# Patient Record
Sex: MALE | Race: WHITE | NOT HISPANIC OR LATINO | URBAN - METROPOLITAN AREA
[De-identification: names, ages, dates, MRNs, and addresses within clinical notes are randomized per-mention and may not be internally consistent; named-entity substitution may affect disease eponyms.]

---

## 2017-03-21 ENCOUNTER — HISTORICAL (OUTPATIENT)
Dept: ADMINISTRATIVE | Facility: HOSPITAL | Age: 77
End: 2017-03-21

## 2018-01-18 ENCOUNTER — HISTORICAL (OUTPATIENT)
Dept: LAB | Facility: HOSPITAL | Age: 78
End: 2018-01-18

## 2018-01-20 LAB — FINAL CULTURE: NO GROWTH

## 2019-10-08 ENCOUNTER — HISTORICAL (OUTPATIENT)
Dept: LAB | Facility: HOSPITAL | Age: 79
End: 2019-10-08

## 2019-10-08 LAB — C DIFF INTERP: NEGATIVE

## 2019-10-11 LAB — FINAL CULTURE: NORMAL

## 2022-04-10 ENCOUNTER — HISTORICAL (OUTPATIENT)
Dept: ADMINISTRATIVE | Facility: HOSPITAL | Age: 82
End: 2022-04-10

## 2022-04-27 VITALS
DIASTOLIC BLOOD PRESSURE: 76 MMHG | WEIGHT: 170 LBS | HEIGHT: 70 IN | SYSTOLIC BLOOD PRESSURE: 152 MMHG | BODY MASS INDEX: 24.34 KG/M2 | OXYGEN SATURATION: 92 %

## 2022-06-30 ENCOUNTER — LAB REQUISITION (OUTPATIENT)
Dept: LAB | Facility: HOSPITAL | Age: 82
End: 2022-06-30
Payer: COMMERCIAL

## 2022-06-30 DIAGNOSIS — N39.0 URINARY TRACT INFECTION, SITE NOT SPECIFIED: ICD-10-CM

## 2022-06-30 DIAGNOSIS — R82.991 HYPOCITRATURIA: ICD-10-CM

## 2022-06-30 PROCEDURE — 87077 CULTURE AEROBIC IDENTIFY: CPT | Performed by: SPECIALIST

## 2022-07-02 LAB — BACTERIA UR CULT: ABNORMAL

## 2022-12-19 ENCOUNTER — LAB REQUISITION (OUTPATIENT)
Dept: LAB | Facility: HOSPITAL | Age: 82
End: 2022-12-19
Payer: COMMERCIAL

## 2022-12-19 DIAGNOSIS — R30.9 PAINFUL MICTURITION, UNSPECIFIED: ICD-10-CM

## 2022-12-19 DIAGNOSIS — N31.9 NEUROMUSCULAR DYSFUNCTION OF BLADDER, UNSPECIFIED: ICD-10-CM

## 2022-12-19 LAB
APPEARANCE UR: ABNORMAL
BACTERIA #/AREA URNS AUTO: ABNORMAL /HPF
BILIRUB UR QL STRIP.AUTO: NEGATIVE MG/DL
COLOR UR AUTO: ABNORMAL
GLUCOSE UR QL STRIP.AUTO: NEGATIVE MG/DL
KETONES UR QL STRIP.AUTO: NEGATIVE MG/DL
LEUKOCYTE ESTERASE UR QL STRIP.AUTO: ABNORMAL UNIT/L
NITRITE UR QL STRIP.AUTO: NEGATIVE
PH UR STRIP.AUTO: 6 [PH]
PROT UR QL STRIP.AUTO: ABNORMAL MG/DL
RBC #/AREA URNS AUTO: <5 /HPF
RBC UR QL AUTO: ABNORMAL UNIT/L
SP GR UR STRIP.AUTO: 1.02 (ref 1–1.03)
SQUAMOUS #/AREA URNS AUTO: <5 /HPF
UROBILINOGEN UR STRIP-ACNC: 0.2 MG/DL
WBC #/AREA URNS AUTO: 493 /HPF

## 2022-12-19 PROCEDURE — 87205 SMEAR GRAM STAIN: CPT | Performed by: SPECIALIST

## 2022-12-19 PROCEDURE — 81001 URINALYSIS AUTO W/SCOPE: CPT | Performed by: SPECIALIST

## 2022-12-19 PROCEDURE — 87088 URINE BACTERIA CULTURE: CPT | Performed by: SPECIALIST

## 2022-12-20 LAB
GRAM STN SPEC: NORMAL

## 2022-12-21 LAB — BACTERIA UR CULT: NO GROWTH

## 2023-03-18 PROBLEM — J44.9 CHRONIC OBSTRUCTIVE PULMONARY DISEASE: Status: ACTIVE | Noted: 2023-03-18

## 2023-03-18 PROBLEM — R73.09 ELEVATED HEMOGLOBIN A1C: Status: ACTIVE | Noted: 2023-03-18

## 2023-03-18 PROBLEM — F41.9 ANXIETY AND DEPRESSION: Status: ACTIVE | Noted: 2023-03-18

## 2023-03-18 PROBLEM — Z85.46 HISTORY OF PROSTATE CANCER: Status: ACTIVE | Noted: 2023-03-18

## 2023-03-18 PROBLEM — F41.0 PANIC DISORDER: Status: ACTIVE | Noted: 2023-03-18

## 2023-03-18 PROBLEM — F32.A ANXIETY AND DEPRESSION: Status: ACTIVE | Noted: 2023-03-18

## 2023-03-18 PROBLEM — I48.91 ATRIAL FIBRILLATION: Status: ACTIVE | Noted: 2023-03-18

## 2023-04-13 ENCOUNTER — LAB REQUISITION (OUTPATIENT)
Dept: LAB | Facility: HOSPITAL | Age: 83
End: 2023-04-13
Payer: MEDICARE

## 2023-04-13 DIAGNOSIS — R33.9 RETENTION OF URINE, UNSPECIFIED: ICD-10-CM

## 2023-04-13 DIAGNOSIS — N39.0 URINARY TRACT INFECTION, SITE NOT SPECIFIED: ICD-10-CM

## 2023-04-13 PROCEDURE — 87205 SMEAR GRAM STAIN: CPT | Performed by: SPECIALIST

## 2023-04-13 PROCEDURE — 87077 CULTURE AEROBIC IDENTIFY: CPT | Performed by: SPECIALIST

## 2023-04-14 LAB
GRAM STN SPEC: NORMAL
GRAM STN SPEC: NORMAL

## 2023-04-15 LAB — BACTERIA UR CULT: ABNORMAL

## 2023-06-08 DIAGNOSIS — E78.2 MIXED HYPERLIPIDEMIA: ICD-10-CM

## 2023-06-08 DIAGNOSIS — R53.1 WEAKNESS: Primary | ICD-10-CM

## 2023-06-08 DIAGNOSIS — R73.9 BLOOD GLUCOSE ELEVATED: ICD-10-CM

## 2023-07-21 RX ORDER — PREDNISONE 10 MG/1
TABLET ORAL
Qty: 47 TABLET | Refills: 0 | Status: SHIPPED | OUTPATIENT
Start: 2023-07-21 | End: 2023-09-08

## 2023-07-24 ENCOUNTER — LAB REQUISITION (OUTPATIENT)
Dept: LAB | Facility: HOSPITAL | Age: 83
End: 2023-07-24
Payer: MEDICARE

## 2023-07-24 DIAGNOSIS — N39.0 URINARY TRACT INFECTION, SITE NOT SPECIFIED: ICD-10-CM

## 2023-07-24 DIAGNOSIS — R33.9 RETENTION OF URINE, UNSPECIFIED: ICD-10-CM

## 2023-07-24 DIAGNOSIS — R30.9 PAINFUL MICTURITION, UNSPECIFIED: ICD-10-CM

## 2023-07-24 LAB
GRAM STN SPEC: NORMAL

## 2023-07-24 PROCEDURE — 87088 URINE BACTERIA CULTURE: CPT | Performed by: SPECIALIST

## 2023-07-24 PROCEDURE — 87186 SC STD MICRODIL/AGAR DIL: CPT | Performed by: SPECIALIST

## 2023-07-24 PROCEDURE — 87205 SMEAR GRAM STAIN: CPT | Performed by: SPECIALIST

## 2023-07-26 LAB — BACTERIA UR CULT: ABNORMAL

## 2023-09-08 RX ORDER — PREDNISONE 10 MG/1
TABLET ORAL
Qty: 47 TABLET | Refills: 0 | Status: ON HOLD | OUTPATIENT
Start: 2023-09-08 | End: 2023-10-17 | Stop reason: HOSPADM

## 2023-09-29 ENCOUNTER — LAB REQUISITION (OUTPATIENT)
Dept: LAB | Facility: HOSPITAL | Age: 83
End: 2023-09-29
Payer: MEDICARE

## 2023-09-29 DIAGNOSIS — R82.991 HYPOCITRATURIA: ICD-10-CM

## 2023-09-29 DIAGNOSIS — R33.9 RETENTION OF URINE, UNSPECIFIED: ICD-10-CM

## 2023-09-29 PROCEDURE — 87186 SC STD MICRODIL/AGAR DIL: CPT | Performed by: SPECIALIST

## 2023-09-29 PROCEDURE — 87088 URINE BACTERIA CULTURE: CPT | Performed by: SPECIALIST

## 2023-10-01 LAB — BACTERIA UR CULT: ABNORMAL

## 2023-10-12 ENCOUNTER — HOSPITAL ENCOUNTER (INPATIENT)
Facility: HOSPITAL | Age: 83
LOS: 5 days | Discharge: HOME | DRG: 871 | End: 2023-10-17
Attending: EMERGENCY MEDICINE | Admitting: HOSPITALIST
Payer: MEDICARE

## 2023-10-12 DIAGNOSIS — A41.9 SEPSIS, DUE TO UNSPECIFIED ORGANISM, UNSPECIFIED WHETHER ACUTE ORGAN DYSFUNCTION PRESENT: ICD-10-CM

## 2023-10-12 DIAGNOSIS — J18.9 COMMUNITY ACQUIRED PNEUMONIA, UNSPECIFIED LATERALITY: Primary | ICD-10-CM

## 2023-10-12 DIAGNOSIS — R05.9 COUGH: ICD-10-CM

## 2023-10-12 DIAGNOSIS — J44.1 COPD WITH ACUTE EXACERBATION: ICD-10-CM

## 2023-10-12 LAB
ABS NEUT (OLG): 26.66 X10(3)/MCL (ref 2.1–9.2)
ALBUMIN SERPL-MCNC: 3.1 G/DL (ref 3.4–4.8)
ALBUMIN/GLOB SERPL: 0.7 RATIO (ref 1.1–2)
ALLENS TEST BLOOD GAS (OHS): ABNORMAL
ALP SERPL-CCNC: 56 UNIT/L (ref 40–150)
ALT SERPL-CCNC: 10 UNIT/L (ref 0–55)
AST SERPL-CCNC: 14 UNIT/L (ref 5–34)
BASE EXCESS BLD CALC-SCNC: -0.2 MMOL/L (ref -2–2)
BILIRUB SERPL-MCNC: 1.2 MG/DL
BLOOD GAS SAMPLE TYPE (OHS): ABNORMAL
BNP BLD-MCNC: 286 PG/ML
BUN SERPL-MCNC: 16 MG/DL (ref 8.4–25.7)
BURR CELLS (OLG): ABNORMAL
CA-I BLD-SCNC: 1.13 MMOL/L (ref 1.12–1.23)
CALCIUM SERPL-MCNC: 9.2 MG/DL (ref 8.8–10)
CHLORIDE SERPL-SCNC: 103 MMOL/L (ref 98–107)
CO2 BLDA-SCNC: 25 MMOL/L
CO2 SERPL-SCNC: 23 MMOL/L (ref 23–31)
COHGB MFR BLDA: 2.4 % (ref 0.5–1.5)
CREAT SERPL-MCNC: 0.62 MG/DL (ref 0.73–1.18)
DRAWN BY BLOOD GAS (OHS): ABNORMAL
ERYTHROCYTE [DISTWIDTH] IN BLOOD BY AUTOMATED COUNT: 13.8 % (ref 11.5–17)
FLUAV AG UPPER RESP QL IA.RAPID: NOT DETECTED
FLUBV AG UPPER RESP QL IA.RAPID: NOT DETECTED
GFR SERPLBLD CREATININE-BSD FMLA CKD-EPI: >60 MLS/MIN/1.73/M2
GLOBULIN SER-MCNC: 4.2 GM/DL (ref 2.4–3.5)
GLUCOSE SERPL-MCNC: 114 MG/DL (ref 82–115)
HCO3 BLDA-SCNC: 23.9 MMOL/L (ref 22–26)
HCT VFR BLD AUTO: 36.2 % (ref 42–52)
HGB BLD-MCNC: 11.8 G/DL (ref 14–18)
INSTRUMENT WBC (OLG): 29.3 X10(3)/MCL
LACTATE SERPL-SCNC: 1.5 MMOL/L (ref 0.5–2.2)
LPM (OHS): 3
LYMPHOCYTES NFR BLD MANUAL: 0.88 X10(3)/MCL
LYMPHOCYTES NFR BLD MANUAL: 3 %
MAGNESIUM SERPL-MCNC: 1.4 MG/DL (ref 1.6–2.6)
MCH RBC QN AUTO: 29.6 PG (ref 27–31)
MCHC RBC AUTO-ENTMCNC: 32.6 G/DL (ref 33–36)
MCV RBC AUTO: 90.7 FL (ref 80–94)
METHGB MFR BLDA: 1.2 % (ref 0.4–1.5)
MONOCYTES NFR BLD MANUAL: 1.47 X10(3)/MCL (ref 0.1–1.3)
MONOCYTES NFR BLD MANUAL: 5 %
NEUTROPHILS NFR BLD MANUAL: 91 %
NRBC BLD AUTO-RTO: 0 %
O2 HB BLOOD GAS (OHS): 96.2 % (ref 94–97)
OXYGEN DEVICE BLOOD GAS (OHS): ABNORMAL
OXYHGB MFR BLDA: 11.2 G/DL (ref 12–16)
PCO2 BLDA: 36 MMHG (ref 35–45)
PH BLDA: 7.43 [PH] (ref 7.35–7.45)
PLATELET # BLD AUTO: 279 X10(3)/MCL (ref 130–400)
PLATELET # BLD EST: NORMAL 10*3/UL
PMV BLD AUTO: 10.9 FL (ref 7.4–10.4)
PO2 BLDA: 110 MMHG (ref 80–100)
POIKILOCYTOSIS BLD QL SMEAR: ABNORMAL
POTASSIUM BLOOD GAS (OHS): 2.9 MMOL/L (ref 3.5–5)
POTASSIUM SERPL-SCNC: 3.1 MMOL/L (ref 3.5–5.1)
PROT SERPL-MCNC: 7.3 GM/DL (ref 5.8–7.6)
RBC # BLD AUTO: 3.99 X10(6)/MCL (ref 4.7–6.1)
RBC MORPH BLD: ABNORMAL
SAMPLE SITE BLOOD GAS (OHS): ABNORMAL
SAO2 % BLDA: 98.4 %
SARS-COV-2 RNA RESP QL NAA+PROBE: NOT DETECTED
SODIUM BLOOD GAS (OHS): 133 MMOL/L (ref 137–145)
SODIUM SERPL-SCNC: 139 MMOL/L (ref 136–145)
TROPONIN I SERPL-MCNC: <0.01 NG/ML (ref 0–0.04)
WBC # SPEC AUTO: 29.27 X10(3)/MCL (ref 4.5–11.5)

## 2023-10-12 PROCEDURE — 36600 WITHDRAWAL OF ARTERIAL BLOOD: CPT

## 2023-10-12 PROCEDURE — 94640 AIRWAY INHALATION TREATMENT: CPT

## 2023-10-12 PROCEDURE — 82803 BLOOD GASES ANY COMBINATION: CPT

## 2023-10-12 PROCEDURE — 63600175 PHARM REV CODE 636 W HCPCS: Performed by: EMERGENCY MEDICINE

## 2023-10-12 PROCEDURE — 99900035 HC TECH TIME PER 15 MIN (STAT)

## 2023-10-12 PROCEDURE — 84484 ASSAY OF TROPONIN QUANT: CPT | Performed by: EMERGENCY MEDICINE

## 2023-10-12 PROCEDURE — 87633 RESP VIRUS 12-25 TARGETS: CPT | Performed by: EMERGENCY MEDICINE

## 2023-10-12 PROCEDURE — 0240U COVID/FLU A&B PCR: CPT | Performed by: EMERGENCY MEDICINE

## 2023-10-12 PROCEDURE — 83880 ASSAY OF NATRIURETIC PEPTIDE: CPT | Performed by: EMERGENCY MEDICINE

## 2023-10-12 PROCEDURE — 80053 COMPREHEN METABOLIC PANEL: CPT | Performed by: EMERGENCY MEDICINE

## 2023-10-12 PROCEDURE — 21400001 HC TELEMETRY ROOM

## 2023-10-12 PROCEDURE — 96367 TX/PROPH/DG ADDL SEQ IV INF: CPT

## 2023-10-12 PROCEDURE — 63600175 PHARM REV CODE 636 W HCPCS: Performed by: PHYSICIAN ASSISTANT

## 2023-10-12 PROCEDURE — 93010 ELECTROCARDIOGRAM REPORT: CPT | Mod: ,,, | Performed by: INTERNAL MEDICINE

## 2023-10-12 PROCEDURE — 27000249 HC VAPOTHERM CIRCUIT

## 2023-10-12 PROCEDURE — 83605 ASSAY OF LACTIC ACID: CPT | Performed by: EMERGENCY MEDICINE

## 2023-10-12 PROCEDURE — 94799 UNLISTED PULMONARY SVC/PX: CPT

## 2023-10-12 PROCEDURE — 63600175 PHARM REV CODE 636 W HCPCS: Performed by: HOSPITALIST

## 2023-10-12 PROCEDURE — 27100171 HC OXYGEN HIGH FLOW UP TO 24 HOURS

## 2023-10-12 PROCEDURE — 25000003 PHARM REV CODE 250: Performed by: EMERGENCY MEDICINE

## 2023-10-12 PROCEDURE — 87040 BLOOD CULTURE FOR BACTERIA: CPT | Performed by: EMERGENCY MEDICINE

## 2023-10-12 PROCEDURE — 25000242 PHARM REV CODE 250 ALT 637 W/ HCPCS: Performed by: EMERGENCY MEDICINE

## 2023-10-12 PROCEDURE — 96375 TX/PRO/DX INJ NEW DRUG ADDON: CPT

## 2023-10-12 PROCEDURE — 93005 ELECTROCARDIOGRAM TRACING: CPT

## 2023-10-12 PROCEDURE — 11000001 HC ACUTE MED/SURG PRIVATE ROOM

## 2023-10-12 PROCEDURE — 99285 EMERGENCY DEPT VISIT HI MDM: CPT | Mod: 25

## 2023-10-12 PROCEDURE — 94761 N-INVAS EAR/PLS OXIMETRY MLT: CPT

## 2023-10-12 PROCEDURE — 96365 THER/PROPH/DIAG IV INF INIT: CPT

## 2023-10-12 PROCEDURE — 94644 CONT INHLJ TX 1ST HOUR: CPT

## 2023-10-12 PROCEDURE — 83735 ASSAY OF MAGNESIUM: CPT | Performed by: EMERGENCY MEDICINE

## 2023-10-12 PROCEDURE — 93010 EKG 12-LEAD: ICD-10-PCS | Mod: ,,, | Performed by: INTERNAL MEDICINE

## 2023-10-12 PROCEDURE — 85027 COMPLETE CBC AUTOMATED: CPT | Performed by: EMERGENCY MEDICINE

## 2023-10-12 RX ORDER — ENOXAPARIN SODIUM 100 MG/ML
40 INJECTION SUBCUTANEOUS EVERY 24 HOURS
Status: DISCONTINUED | OUTPATIENT
Start: 2023-10-12 | End: 2023-10-17 | Stop reason: HOSPADM

## 2023-10-12 RX ORDER — PREDNISONE 20 MG/1
40 TABLET ORAL 2 TIMES DAILY
Status: DISCONTINUED | OUTPATIENT
Start: 2023-10-12 | End: 2023-10-15

## 2023-10-12 RX ORDER — GLUCAGON 1 MG
1 KIT INJECTION
Status: DISCONTINUED | OUTPATIENT
Start: 2023-10-12 | End: 2023-10-17 | Stop reason: HOSPADM

## 2023-10-12 RX ORDER — ALBUTEROL SULFATE 0.83 MG/ML
10 SOLUTION RESPIRATORY (INHALATION)
Status: COMPLETED | OUTPATIENT
Start: 2023-10-12 | End: 2023-10-12

## 2023-10-12 RX ORDER — ONDANSETRON 2 MG/ML
4 INJECTION INTRAMUSCULAR; INTRAVENOUS EVERY 4 HOURS PRN
Status: DISCONTINUED | OUTPATIENT
Start: 2023-10-12 | End: 2023-10-17 | Stop reason: HOSPADM

## 2023-10-12 RX ORDER — POTASSIUM CHLORIDE 20 MEQ/1
40 TABLET, EXTENDED RELEASE ORAL
Status: COMPLETED | OUTPATIENT
Start: 2023-10-12 | End: 2023-10-12

## 2023-10-12 RX ORDER — ALPRAZOLAM 0.5 MG/1
0.5 TABLET ORAL DAILY
Status: DISCONTINUED | OUTPATIENT
Start: 2023-10-13 | End: 2023-10-17 | Stop reason: HOSPADM

## 2023-10-12 RX ORDER — TAMSULOSIN HYDROCHLORIDE 0.4 MG/1
1 CAPSULE ORAL DAILY
Status: DISCONTINUED | OUTPATIENT
Start: 2023-10-13 | End: 2023-10-17 | Stop reason: HOSPADM

## 2023-10-12 RX ORDER — ACETAMINOPHEN 325 MG/1
650 TABLET ORAL EVERY 4 HOURS PRN
Status: DISCONTINUED | OUTPATIENT
Start: 2023-10-12 | End: 2023-10-17 | Stop reason: HOSPADM

## 2023-10-12 RX ORDER — DEXAMETHASONE SODIUM PHOSPHATE 4 MG/ML
8 INJECTION, SOLUTION INTRA-ARTICULAR; INTRALESIONAL; INTRAMUSCULAR; INTRAVENOUS; SOFT TISSUE
Status: COMPLETED | OUTPATIENT
Start: 2023-10-12 | End: 2023-10-12

## 2023-10-12 RX ORDER — FINASTERIDE 5 MG/1
5 TABLET, FILM COATED ORAL DAILY
Status: DISCONTINUED | OUTPATIENT
Start: 2023-10-13 | End: 2023-10-17 | Stop reason: HOSPADM

## 2023-10-12 RX ORDER — FLUOXETINE HYDROCHLORIDE 20 MG/1
20 CAPSULE ORAL DAILY
Status: DISCONTINUED | OUTPATIENT
Start: 2023-10-13 | End: 2023-10-17 | Stop reason: HOSPADM

## 2023-10-12 RX ORDER — IPRATROPIUM BROMIDE AND ALBUTEROL SULFATE 2.5; .5 MG/3ML; MG/3ML
3 SOLUTION RESPIRATORY (INHALATION) EVERY 6 HOURS PRN
Status: DISCONTINUED | OUTPATIENT
Start: 2023-10-12 | End: 2023-10-17 | Stop reason: HOSPADM

## 2023-10-12 RX ORDER — IPRATROPIUM BROMIDE AND ALBUTEROL SULFATE 2.5; .5 MG/3ML; MG/3ML
3 SOLUTION RESPIRATORY (INHALATION)
Status: COMPLETED | OUTPATIENT
Start: 2023-10-12 | End: 2023-10-12

## 2023-10-12 RX ORDER — ACETAMINOPHEN 325 MG/1
650 TABLET ORAL EVERY 8 HOURS PRN
Status: DISCONTINUED | OUTPATIENT
Start: 2023-10-12 | End: 2023-10-17 | Stop reason: HOSPADM

## 2023-10-12 RX ORDER — IBUPROFEN 200 MG
24 TABLET ORAL
Status: DISCONTINUED | OUTPATIENT
Start: 2023-10-12 | End: 2023-10-17 | Stop reason: HOSPADM

## 2023-10-12 RX ORDER — ROFLUMILAST 500 UG/1
1 TABLET ORAL DAILY
Status: DISCONTINUED | OUTPATIENT
Start: 2023-10-13 | End: 2023-10-17 | Stop reason: HOSPADM

## 2023-10-12 RX ORDER — LEVALBUTEROL INHALATION SOLUTION 0.63 MG/3ML
0.63 SOLUTION RESPIRATORY (INHALATION) EVERY 8 HOURS
Status: DISCONTINUED | OUTPATIENT
Start: 2023-10-13 | End: 2023-10-13

## 2023-10-12 RX ORDER — IBUPROFEN 200 MG
16 TABLET ORAL
Status: DISCONTINUED | OUTPATIENT
Start: 2023-10-12 | End: 2023-10-17 | Stop reason: HOSPADM

## 2023-10-12 RX ORDER — MAGNESIUM SULFATE HEPTAHYDRATE 40 MG/ML
2 INJECTION, SOLUTION INTRAVENOUS
Status: COMPLETED | OUTPATIENT
Start: 2023-10-12 | End: 2023-10-12

## 2023-10-12 RX ORDER — LEVALBUTEROL INHALATION SOLUTION 0.63 MG/3ML
0.63 SOLUTION RESPIRATORY (INHALATION) EVERY 8 HOURS PRN
Status: DISCONTINUED | OUTPATIENT
Start: 2023-10-12 | End: 2023-10-17 | Stop reason: HOSPADM

## 2023-10-12 RX ORDER — ASPIRIN 81 MG/1
81 TABLET ORAL DAILY
Status: DISCONTINUED | OUTPATIENT
Start: 2023-10-13 | End: 2023-10-17 | Stop reason: HOSPADM

## 2023-10-12 RX ORDER — SODIUM CHLORIDE 0.9 % (FLUSH) 0.9 %
10 SYRINGE (ML) INJECTION EVERY 12 HOURS PRN
Status: DISCONTINUED | OUTPATIENT
Start: 2023-10-12 | End: 2023-10-17 | Stop reason: HOSPADM

## 2023-10-12 RX ADMIN — AZITHROMYCIN MONOHYDRATE 500 MG: 500 INJECTION, POWDER, LYOPHILIZED, FOR SOLUTION INTRAVENOUS at 11:10

## 2023-10-12 RX ADMIN — POTASSIUM CHLORIDE 40 MEQ: 1500 TABLET, EXTENDED RELEASE ORAL at 01:10

## 2023-10-12 RX ADMIN — MAGNESIUM SULFATE HEPTAHYDRATE 2 G: 40 INJECTION, SOLUTION INTRAVENOUS at 01:10

## 2023-10-12 RX ADMIN — SODIUM CHLORIDE, POTASSIUM CHLORIDE, SODIUM LACTATE AND CALCIUM CHLORIDE 1000 ML: 600; 310; 30; 20 INJECTION, SOLUTION INTRAVENOUS at 10:10

## 2023-10-12 RX ADMIN — IPRATROPIUM BROMIDE AND ALBUTEROL SULFATE 3 ML: 2.5; .5 SOLUTION RESPIRATORY (INHALATION) at 01:10

## 2023-10-12 RX ADMIN — PREDNISONE 40 MG: 20 TABLET ORAL at 09:10

## 2023-10-12 RX ADMIN — DEXAMETHASONE SODIUM PHOSPHATE 8 MG: 4 INJECTION, SOLUTION INTRA-ARTICULAR; INTRALESIONAL; INTRAMUSCULAR; INTRAVENOUS; SOFT TISSUE at 10:10

## 2023-10-12 RX ADMIN — ALBUTEROL SULFATE 10 MG: 2.5 SOLUTION RESPIRATORY (INHALATION) at 11:10

## 2023-10-12 RX ADMIN — CEFTRIAXONE SODIUM 1 G: 1 INJECTION, POWDER, FOR SOLUTION INTRAMUSCULAR; INTRAVENOUS at 11:10

## 2023-10-12 RX ADMIN — ENOXAPARIN SODIUM 40 MG: 40 INJECTION SUBCUTANEOUS at 06:10

## 2023-10-12 NOTE — NURSING
Nurses Note -- 4 Eyes      10/12/2023   5:59 PM      Skin assessed during: Admit      [x] No Altered Skin Integrity Present    []Prevention Measures Documented      [] Yes- Altered Skin Integrity Present or Discovered   [] LDA Added if Not in Epic (Describe Wound)   [] New Altered Skin Integrity was Present on Admit and Documented in LDA   [] Wound Image Taken    Wound Care Consulted? No    Attending Nurse:  Deb Soni LPN    Second RN/Staff Member:     Elisa Means LPN

## 2023-10-12 NOTE — ED PROVIDER NOTES
Encounter Date: 10/12/2023       History     Chief Complaint   Patient presents with    Shortness of Breath     AASI with SOB started last night, hx COPD on home 2L NC. Inc work of breathing at 30 RPM. Fevers at home with diarrhea and wet cough.     83-year-old male with a history of COPD, hypertension presents to the emergency department for evaluation of worsening shortness of breath since last night, reportedly with fever 101 at home as well; however, afebrile on ED arrival without antipyretic use.  EMS reports increased work of breathing, 30 +breaths per minute, wheezing and rales noted in transport.  He is on chronic home O2, usually between 2-3 L, satting well on those settings at this time.  Reports an episode of diarrhea this morning as well    On review of records, patient chronically on doxycycline 100 mg daily with a 90 day prescription at bedside    The history is provided by the patient, the EMS personnel and medical records.   Shortness of Breath  This is a new problem. The problem occurs continuously.The current episode started yesterday. The problem has been gradually worsening. Associated symptoms include a fever, cough, sputum production and wheezing. Pertinent negatives include no vomiting, no abdominal pain, no leg pain and no leg swelling.     Review of patient's allergies indicates:   Allergen Reactions    Penicillins      Past Medical History:   Diagnosis Date    Anxiety and depression 03/18/2023    Chronic obstructive lung disease 03/21/2023    Elevated hemoglobin A1c 03/18/2023    Essential (primary) hypertension 03/21/2023    History of prostate cancer 03/18/2023    Irritant contact dermatitis due to detergents 03/21/2023    Mixed hyperlipidemia 03/21/2023    Panic disorder 03/18/2023     Past Surgical History:   Procedure Laterality Date    CATARACT EXTRACTION Bilateral     COLONOSCOPY      HERNIA REPAIR       Family History   Problem Relation Age of Onset    Kidney disease Mother           due to complications of kidney disease    Heart disease Father          due to complications of heart disease    Cancer Sister         Cancer of the jaw     Social History     Tobacco Use    Smoking status: Former     Current packs/day: 0.00     Types: Cigarettes     Quit date: 2012     Years since quittin.2    Smokeless tobacco: Never   Substance Use Topics    Alcohol use: Yes    Drug use: Never     Review of Systems   Constitutional:  Positive for fever.   Respiratory:  Positive for cough, sputum production, shortness of breath and wheezing.    Cardiovascular:  Negative for leg swelling.   Gastrointestinal:  Positive for diarrhea. Negative for abdominal pain, nausea and vomiting.       Physical Exam     Initial Vitals [10/12/23 1040]   BP Pulse Resp Temp SpO2   (!) 122/58 (!) 116 (!) 30 98.1 °F (36.7 °C) 95 %      MAP       --         Physical Exam    Nursing note and vitals reviewed.  Constitutional: He appears well-developed and well-nourished. He appears distressed.   Eyes: Conjunctivae are normal. Pupils are equal, round, and reactive to light. No scleral icterus.   Neck: Neck supple.   Normal range of motion.  Cardiovascular:  Regular rhythm.           Pulmonary/Chest: He is in respiratory distress. He has rales.   Abdominal: Abdomen is soft. He exhibits no distension. There is no abdominal tenderness.   Musculoskeletal:         General: No edema.      Cervical back: Normal range of motion and neck supple.     Neurological: He is alert and oriented to person, place, and time. No cranial nerve deficit.   Skin: Skin is warm and dry.       ED Course   Critical Care    Date/Time: 10/12/2023 10:47 AM    Performed by: Livia Victoria MD  Authorized by: Livia Victoria MD  Direct patient critical care time: 27 minutes  Additional history critical care time: 3 minutes  Ordering / reviewing critical care time: 6 minutes  Documentation critical care time: 4 minutes  Consulting other physicians  critical care time: 4 minutes  Total critical care time (exclusive of procedural time) : 44 minutes  Critical care time was exclusive of separately billable procedures and treating other patients.  Critical care was necessary to treat or prevent imminent or life-threatening deterioration of the following conditions: respiratory failure, circulatory failure and sepsis.  Critical care was time spent personally by me on the following activities: development of treatment plan with patient or surrogate, discussions with consultants, evaluation of patient's response to treatment, interpretation of cardiac output measurements, examination of patient, ordering and performing treatments and interventions, obtaining history from patient or surrogate, ordering and review of laboratory studies, ordering and review of radiographic studies, pulse oximetry, re-evaluation of patient's condition and review of old charts.      Labs Reviewed   COMPREHENSIVE METABOLIC PANEL - Abnormal; Notable for the following components:       Result Value    Potassium Level 3.1 (*)     Creatinine 0.62 (*)     Albumin Level 3.1 (*)     Globulin 4.2 (*)     Albumin/Globulin Ratio 0.7 (*)     All other components within normal limits   B-TYPE NATRIURETIC PEPTIDE - Abnormal; Notable for the following components:    Natriuretic Peptide 286.0 (*)     All other components within normal limits   CBC WITH DIFFERENTIAL - Abnormal; Notable for the following components:    WBC 29.27 (*)     RBC 3.99 (*)     Hgb 11.8 (*)     Hct 36.2 (*)     MCHC 32.6 (*)     MPV 10.9 (*)     All other components within normal limits   MAGNESIUM - Abnormal; Notable for the following components:    Magnesium Level 1.40 (*)     All other components within normal limits   MANUAL DIFFERENTIAL - Abnormal; Notable for the following components:    Neutrophils Abs 26.663 (*)     Monocytes Abs 1.465 (*)     RBC Morph Abnormal (*)     Poikilocytosis 1+ (*)     Francisca Cells 1+ (*)     All  other components within normal limits   TROPONIN I - Normal   COVID/FLU A&B PCR - Normal    Narrative:     The Xpert Xpress SARS-CoV-2/FLU/RSV plus is a rapid, multiplexed real-time PCR test intended for the simultaneous qualitative detection and differentiation of SARS-CoV-2, Influenza A, Influenza B, and respiratory syncytial virus (RSV) viral RNA in either nasopharyngeal swab or nasal swab specimens.         LACTIC ACID, PLASMA - Normal   BLOOD CULTURE [facility]   BLOOD CULTURE [facility]   CBC W/ AUTO DIFFERENTIAL    Narrative:     The following orders were created for panel order CBC auto differential.  Procedure                               Abnormality         Status                     ---------                               -----------         ------                     CBC with Differential[[id number]]       Abnormal            Final result               Manual Differential[[id number]]         Abnormal            Final result                 Please view results for these tests on the individual orders.   RESPIRATORY PANEL   BLOOD GAS     EKG Readings: (Independently Interpreted)   Initial Reading: No STEMI. Rhythm: Atrial Flutter. Heart Rate: 95. ST Segments: Non-Specific ST Segment Depression.   10/12/2023 @ 1049       Imaging Results              X-Ray Chest AP Portable (Final result)  Result time 10/12/23 11:13:21      Final result by Manoj Lentz MD (10/12/23 11:13:21)                   Impression:      Prominent interstitial markings of the bilateral lower lobes increased in the interval, however believed related to worsening chronic lung process.  Superimposed early infectious process is not entirely excluded.      Electronically signed by: Manoj Lentz  Date:    10/12/2023  Time:    11:13               Narrative:    EXAMINATION:  XR CHEST AP PORTABLE    CLINICAL HISTORY:  Cough, unspecified    TECHNIQUE:  Single view of the chest    COMPARISON:  04/02/2018    FINDINGS:  Prominent interstitial markings of  the bilateral lower lobes increased in the interval.    The cardiomediastinal silhouette is within normal limits.    No acute osseous abnormality.                                       Medications   magnesium sulfate 2g in water 50mL IVPB (premix) (has no administration in time range)   potassium chloride SA CR tablet 40 mEq (has no administration in time range)   albuterol-ipratropium 2.5 mg-0.5 mg/3 mL nebulizer solution 3 mL (has no administration in time range)   dexAMETHasone injection 8 mg (8 mg Intravenous Given 10/12/23 1054)   albuterol nebulizer solution 10 mg (10 mg Nebulization Given 10/12/23 1109)   lactated ringers bolus 1,000 mL (0 mLs Intravenous Stopped 10/12/23 1157)   cefTRIAXone (ROCEPHIN) 1 g in dextrose 5 % in water (D5W) 100 mL IVPB (MB+) (0 g Intravenous Stopped 10/12/23 1157)   azithromycin 500 mg in dextrose 5 % 250 mL IVPB (ready to mix) (500 mg Intravenous New Bag 10/12/23 1143)     Medical Decision Making  Problems Addressed:  Community acquired pneumonia, unspecified laterality: acute illness or injury that poses a threat to life or bodily functions  COPD with acute exacerbation: chronic illness or injury with exacerbation, progression, or side effects of treatment that poses a threat to life or bodily functions  Sepsis, due to unspecified organism, unspecified whether acute organ dysfunction present: acute illness or injury that poses a threat to life or bodily functions    Amount and/or Complexity of Data Reviewed  Labs: ordered. Decision-making details documented in ED Course.  Radiology: ordered.    Risk  Prescription drug management.  Decision regarding hospitalization.      ED assessment:    Mr. Sweeney presented for evaluation of shortness of breath since last night, reportedly febrile as well however afebrile on arrival.  Tachycardic and tachypneic, on chronic doxycycline therapy.  Evaluation for COPD exacerbation versus acute viral syndrome versus pneumonia undertaken, empiric  antibiotics given as meets multiple SIRS criteria on arrival.     Differential diagnosis (including but not limited to):   COPD with acute exacerbation, bronchitis, acute viral syndrome, pneumonia, heart failure, pulmonary edema, pleural effusion, acute on chronic respiratory failure, acute coronary syndrome, acute kidney injury, electrolyte derangements    ED management:   See ED course below    My independent radiology interpretation:   Chest x-ray:  Bilateral basilar opacities    Amount and/or Complexity of Data Reviewed  Independent historian: EMS   Summary of history:  Reports on chronic home O2, increased work of breathing throughout pre-hospital evaluation and transport, placed on O2 and given IV fluids  External data reviewed: notes from previous admissions, notes from clinic visits, and prescription medications   Summary of data reviewed:  No admission inpatient since 2018.  Follows with outpatient Urology, Pulmonary Medicine and primary  Risk and benefits of testing: discussed   Labs: ordered and reviewed  Radiology: ordered and independent interpretation performed (see above or ED course)  ECG/medicine tests: ordered and independent interpretation performed (see above or ED course)  Discussion of management or test interpretation with external provider(s): discussed with hospitalist physician   Summary of discussion:  as below    Risk  Decision regarding hospitalization  Shared decision making     Critical Care  30-74 minutes     ILivia MD personally performed the history, PE, MDM, and procedures as documented above and agree with the scribe's documentation.              ED Course as of 10/12/23 1301   Thu Oct 12, 2023   1102 EKG with what appears to be atrial flutter, there is some baseline artifact; however, what appears to be clear flutter waves in V1, V2, will re-attempt EKG once breathing more regulated. [KS]   1116 WBC(!): 29.27  WBC 31759, antibiotics have been ordered. [KS]   1245 Should  feeling modestly improved, will give DuoNeb as well.  ABG ordered.  Hypomagnesemic and hypokalemic, repletion has been initiated in the ED. chest x-ray reviewed, suspect early pneumonia.  Hospitalist paged for admission [KS]   1245 Discussed with respiratory therapist, will try Vapotherm for additional positive pressure support though does not need additional oxygenation [KS]   1252 Discussed with GURMEET Retana with the hospitalist service who accepts for admission on behalf of Dr. Hatfield   [KS]      ED Course User Index  [KS] Livia Victoria MD                      Clinical Impression:   Final diagnoses:  [R05.9] Cough  [J18.9] Community acquired pneumonia, unspecified laterality (Primary)  [J44.1] COPD with acute exacerbation  [A41.9] Sepsis, due to unspecified organism, unspecified whether acute organ dysfunction present        ED Disposition Condition    Admit Stable                Livia Victoria MD  10/12/23 2831

## 2023-10-12 NOTE — H&P
[facility] Medicine History & Physical Examination       Patient Name: Sher Sweeney  MRN: [id number]  Patient Class: IP- Inpatient   Admission Date: 10/12/2023   Admitting Physician: James Hatfield MD   Length of Stay: 0  Attending Physician: James Hatfield MD   Primary Care Provider: Fernanda Simental MD  Face-to-Face encounter date: 10/12/2023  Code Status: Full Code   Chief Complaint: Shortness of Breath (AASI with SOB started last night, hx COPD on home 2L NC. Inc work of breathing at 30 RPM. Fevers at home with diarrhea and wet cough.)        Patient information was obtained from patient, patient's family, past medical records and ER records.     HISTORY OF PRESENT ILLNESS:   Sher Sweeney is a 83 y.o. White male with a past medical history of hypertension, hyperlipidemia, COPD on 2 L O2 at home, panic order, essential tremors and prostate cancer receiving injections every 3 months. The patient presented to [facility] on 10/12/2023 with a primary complaint of shortness of breath.  Patient reports using oxygen at home intermittently at 2 L but has been having to wear it all the time over the last few days.  He reports normally having a cough with mucus production but has not been coughing much lately.  He feels as if his congestion is mostly in his chest.  He states his temperature reached a max of 101° F yesterday.  He denies complaints of chest pain, nausea and vomiting.  He has been using home nebulizer treatments without much relief in symptoms.  His pulmonologist is Dr. Rabago.    Upon presentation to the ED, temperature 98.1° F, heart rate 116, blood pressure 122/58, respiratory rate 30 and SpO2 95% on 2 L nasal cannula. Labs with WBC 29.2, H&H 7.8/36.2, potassium 3.1, , troponin less than 0.01.  ABG with pH 11.43, pCO2 36, PO2 110, pHC03 Influenza A and B and SARS-COV-2 PCR negative.  EKG atrial flutter with variable AV block and premature ventricular/aberrantly  conducted complexes and nonspecific ST and T-wave abnormalities.  Chest x-ray with prominent interstitial markings in bilateral lower lobes increased in interval however may be related to worsening chronic lung process, superimposed early infectious process is not entirely excluded.    PAST MEDICAL HISTORY:     Past Medical History:   Diagnosis Date    Anxiety and depression 2023    Chronic obstructive lung disease 2023    Elevated hemoglobin A1c 2023    Essential (primary) hypertension 2023    History of prostate cancer 2023    Irritant contact dermatitis due to detergents 2023    Mixed hyperlipidemia 2023    Panic disorder 2023       PAST SURGICAL HISTORY:     Past Surgical History:   Procedure Laterality Date    CATARACT EXTRACTION Bilateral     COLONOSCOPY      HERNIA REPAIR         ALLERGIES:   Penicillins    FAMILY HISTORY:   Reviewed and negative    SOCIAL HISTORY:     Social History     Tobacco Use    Smoking status: Former     Current packs/day: 0.00     Types: Cigarettes     Quit date: 2012     Years since quittin.2    Smokeless tobacco: Never   Substance Use Topics    Alcohol use: Yes        HOME MEDICATIONS:     Prior to Admission medications    Medication Sig Start Date End Date Taking? Authorizing Provider   albuterol-ipratropium (DUO-NEB) 2.5 mg-0.5 mg/3 mL nebulizer solution Take 3 mLs by nebulization every 6 (six) hours as needed for Wheezing. Rescue    Provider, Historical   ALPRAZolam (XANAX) 0.5 MG tablet TAKE 1 TABLET BY MOUTH EVERY DAY 4/10/23   Fernanda Simental MD   aspirin (ECOTRIN) 81 MG EC tablet Take 81 mg by mouth once daily.    Provider, Historical   DALIRESP 500 mcg Tab Take 1 tablet (500 mcg total) by mouth once daily. Take 1 tablet by mouth once daily. 22   MARSHA Rabago MD   doxycycline (VIBRAMYCIN) 100 MG Cap  11/10/22   Provider, Historical   finasteride (PROSCAR) 5 mg tablet  22   Provider, Historical   FLUoxetine  20 MG capsule TAKE 1 CAPSULE BY MOUTH DAILY 4/26/23   Fernanda Simental MD   fluticasone-umeclidin-vilanter (TRELEGY ELLIPTA) 100-62.5-25 mcg DsDv INHALE 1 PUFF BY MOUTH DAILY AT THE SAME TIME EVERY_DAY 12/15/22   Nat Candelaria FNP   predniSONE (DELTASONE) 10 MG tablet TAKE 5 TABLETS BY MOUTH DAILY X 3 DAYS, 4 TABLETS DAILY X 3 DAYS, 3 TABLETS DAILY X 3 DAYS, 2 TABLETS DAILY X 3 DAYS, THEN 1 TABLET DAILY X 5 9/8/23   Fernanda Simental MD   roflumilast (DALIRESP) 500 mcg Tab Take 1 tablet (500 mcg total) by mouth once daily. 9/18/23   Renea Pardo FNP   tamsulosin (FLOMAX) 0.4 mg Cap Take 1 capsule by mouth. 4/10/23   Provider, Historical       REVIEW OF SYSTEMS:   Except as documented, all other systems reviewed and negative     PHYSICAL EXAM:     VITAL SIGNS: 24 HRS MIN & MAX LAST   Temp  Min: 98.1 °F (36.7 °C)  Max: 98.1 °F (36.7 °C) 98.1 °F (36.7 °C)   BP  Min: 122/58  Max: 126/72 126/72   Pulse  Min: 109  Max: 116  109   Resp  Min: 20  Max: 30 20   SpO2  Min: 95 %  Max: 98 % 98 %       General appearance: Well-developed, well-nourished male in no apparent distress.  No family at bedside.  HEENT: Atraumatic head.  Dry mucous membranes of oral cavity. Atrophy of cheeks and temples bilaterally.   Lungs:  Coarse breath sounds to right lung.  Clear to auscultation left lung.  On Vapotherm with 30 liters/minute and FiO2 40% with oxygen saturation 96-97%.  Heart: Regular rate and rhythm.  No edema to bilateral lower extremities.  Abdomen: Soft, non-distended.  Extremities: No cyanosis, clubbing. No deformities.  Skin: No Rash. Warm and dry.  Neuro: Awake, alert and oriented. Motor and sensory exams grossly intact.  Tremors to BUE.  Psych/mental status: Appropriate mood and affect. Cooperative. Responds appropriately to questions.       LABS AND IMAGING:     Recent Labs   Lab 10/12/23  1100   WBC 29.3  29.27*   RBC 3.99*   HGB 11.8*   HCT 36.2*   MCV 90.7   MCH 29.6   MCHC 32.6*   RDW 13.8      MPV 10.9*        Recent Labs   Lab 10/12/23  1100 10/12/23  1300     --    K 3.1*  --    CO2 23  --    BUN 16.0  --    CREATININE 0.62*  --    CALCIUM 9.2  --    PH  --  7.430   MG 1.40*  --    ALBUMIN 3.1*  --    ALKPHOS 56  --    ALT 10  --    AST 14  --    BILITOT 1.2  --        Microbiology Results (last 7 days)       Procedure Component Value Units Date/Time    Blood Culture #1 **CANNOT BE ORDERED STAT** [[id number]] Collected: 10/12/23 1112    Order Status: Resulted Specimen: Blood Updated: 10/12/23 1121    Blood Culture #2 **CANNOT BE ORDERED STAT** [[id number]] Collected: 10/12/23 1112    Order Status: Resulted Specimen: Blood Updated: 10/12/23 1121             X-Ray Chest AP Portable  Narrative: EXAMINATION:  XR CHEST AP PORTABLE    CLINICAL HISTORY:  Cough, unspecified    TECHNIQUE:  Single view of the chest    COMPARISON:  04/02/2018    FINDINGS:  Prominent interstitial markings of the bilateral lower lobes increased in the interval.    The cardiomediastinal silhouette is within normal limits.    No acute osseous abnormality.  Impression: Prominent interstitial markings of the bilateral lower lobes increased in the interval, however believed related to worsening chronic lung process.  Superimposed early infectious process is not entirely excluded.    Electronically signed by: Manoj Lentz  Date:    10/12/2023  Time:    11:13        ASSESSMENT & PLAN:   Assessment:  Bilateral lower lobe interstitial markings, worsening chronic lung disease versus pneumonia  Normocytic anemia   Hypokalemia   Elevated BNP  History hypertension, hyperlipidemia, COPD on 2 L O2 at home, panic order and prostate cancer receiving injections every 3 months, essential tremors    Plan:  - Continue with supplemental oxygen, weaning as tolerated   - Continue with Rocephin and azithromycin   - Xopenex as needed for shortness of breath and wheezing  - Resume appropriate home medications when deemed necessary   - Labs in AM      VTE  Prophylaxis: will be placed on Lovenox for DVT prophylaxis and will be advised to be as mobile as possible and sit in a chair as tolerated      __________________________________________________________________________  INPATIENT LIST OF MEDICATIONS     Current Facility-Administered Medications:     acetaminophen tablet 650 mg, 650 mg, Oral, Q8H PRN, Shelley Santana, PA-C    acetaminophen tablet 650 mg, 650 mg, Oral, Q4H PRN, Shelley Santana PA-DEWAYNE    albuterol-ipratropium 2.5 mg-0.5 mg/3 mL nebulizer solution 3 mL, 3 mL, Nebulization, ED 1 Time, Livia Victoria MD    [START ON 10/13/2023] azithromycin 500 mg in dextrose 5 % 250 mL IVPB (ready to mix), 500 mg, Intravenous, Q24H, Shelley Santana PA-DEWAYNE    [START ON 10/13/2023] cefTRIAXone (ROCEPHIN) 1 g in dextrose 5 % in water (D5W) 100 mL IVPB (MB+), 1 g, Intravenous, Q24H, Shelley Santana, PA-DEWAYNE    dextrose 10% bolus 125 mL 125 mL, 12.5 g, Intravenous, PRN, Shelley Santana, PA-DEWAYNE    dextrose 10% bolus 250 mL 250 mL, 25 g, Intravenous, PRN, Shelley Santana, PA-DEWAYNE    enoxaparin injection 40 mg, 40 mg, Subcutaneous, Daily, Shelley Santana, PA-DEWAYNE    glucagon (human recombinant) injection 1 mg, 1 mg, Intramuscular, PRN, Shelley Santana, PA-DEWAYNE    glucose chewable tablet 16 g, 16 g, Oral, PRN, Shelley Santana, PA-C    glucose chewable tablet 24 g, 24 g, Oral, PRN, Shelley Santana, PA-C    magnesium sulfate 2g in water 50mL IVPB (premix), 2 g, Intravenous, ED 1 Time, Livia Victoria MD    ondansetron injection 4 mg, 4 mg, Intravenous, Q4H PRN, Shelley Santana PA-C    potassium chloride SA CR tablet 40 mEq, 40 mEq, Oral, ED 1 Time, Livia Victoria MD    sodium chloride 0.9% flush 10 mL, 10 mL, Intravenous, Q12H PRN, Shelley Santana PA-C    Current Outpatient Medications:     albuterol-ipratropium (DUO-NEB) 2.5 mg-0.5 mg/3 mL nebulizer solution, Take 3 mLs by nebulization every 6 (six) hours as needed for Wheezing. Rescue, Disp: , Rfl:      ALPRAZolam (XANAX) 0.5 MG tablet, TAKE 1 TABLET BY MOUTH EVERY DAY, Disp: 90 tablet, Rfl: 1    aspirin (ECOTRIN) 81 MG EC tablet, Take 81 mg by mouth once daily., Disp: , Rfl:     DALIRESP 500 mcg Tab, Take 1 tablet (500 mcg total) by mouth once daily. Take 1 tablet by mouth once daily., Disp: 30 tablet, Rfl: 6    doxycycline (VIBRAMYCIN) 100 MG Cap, , Disp: , Rfl:     finasteride (PROSCAR) 5 mg tablet, , Disp: , Rfl:     FLUoxetine 20 MG capsule, TAKE 1 CAPSULE BY MOUTH DAILY, Disp: 90 capsule, Rfl: 1    fluticasone-umeclidin-vilanter (TRELEGY ELLIPTA) 100-62.5-25 mcg DsDv, INHALE 1 PUFF BY MOUTH DAILY AT THE SAME TIME EVERY_DAY, Disp: 3 each, Rfl: 3    predniSONE (DELTASONE) 10 MG tablet, TAKE 5 TABLETS BY MOUTH DAILY X 3 DAYS, 4 TABLETS DAILY X 3 DAYS, 3 TABLETS DAILY X 3 DAYS, 2 TABLETS DAILY X 3 DAYS, THEN 1 TABLET DAILY X 5, Disp: 47 tablet, Rfl: 0    roflumilast (DALIRESP) 500 mcg Tab, Take 1 tablet (500 mcg total) by mouth once daily., Disp: 30 tablet, Rfl: 11    tamsulosin (FLOMAX) 0.4 mg Cap, Take 1 capsule by mouth., Disp: , Rfl:       Scheduled Meds:   albuterol-ipratropium  3 mL Nebulization ED 1 Time    [START ON 10/13/2023] azithromycin  500 mg Intravenous Q24H    [START ON 10/13/2023] cefTRIAXone (ROCEPHIN) IVPB  1 g Intravenous Q24H    enoxparin  40 mg Subcutaneous Daily    magnesium sulfate IVPB  2 g Intravenous ED 1 Time    potassium chloride  40 mEq Oral ED 1 Time     Continuous Infusions:  PRN Meds:.acetaminophen, acetaminophen, dextrose 10%, dextrose 10%, glucagon (human recombinant), glucose, glucose, ondansetron, sodium chloride 0.9%      Discharge Planning and Disposition: Anticipated discharge to be determined.    Shelley MCCLURE PA, have reviewed and discussed the case with Dr. James Hatfield MD    Please see the following addendum for further assessment and plan from there attending MD.    Shelley Santana PA-C  10/12/2023      For this patient encounter, I reviewed the NP/PA/resident  documentation, treatment plan, and medical decision making; and I had face-to-face time with this patient.     History: Reviewed HPI, medical, surgical, family, and social histories as above    Physical exam: Agree with documentation as above    Treatment Plan:  83-year-old male with past medical history of hypertension, COPD on 2 L home oxygen, generalized anxiety disorder, prostate cancer, and extremely hard of hearing presents to the emergency room on 10/12 with complaint of worsening shortness of breath.  States that he uses his oxygen intermittently at home but has now been requiring it all the time.  He was followed by Pulmonary as an outpatient.  States that he called the office instructed to come to the emergency room.  Did have a fever of 101 yesterday.  Increased cough but not producing much mucus.  In the emergency room he was found to be 95% on 2 L nasal cannula.  He was then placed on a Vapotherm currently going at 30 L and 40% FiO2 with oxygen saturation at 98%.  Will need to wean this down due to his chronic COPD.  His respiratory rate is elevated he is tachypneic.  White count was notable at 29.27.  Mild anemia seems to be at his baseline.  BNP was mildly elevated.  Renal function at baseline.  Flu and COVID were negative.  Lactic acid normal.  Chest x-ray showed prominent interstitial markings likely due to worsening of his chronic lung process.    Patient started empirically IV Rocephin and azithromycin.  Will place him on steroids as well.  Will need to wean down his oxygen to keep his saturation between 85 and 92%.  Avoid over oxygenation.  Will see how he does over the next 24 hours but will consider getting his pulmonary team involved.    Critical care diagnosis: acute hypoxemic respiratory failure requiring high-flow oxygen  Critical care interventions: hands on evaluation, review of labs/radiographs/records and discussions with family  Critical care time spent: >32 minutes

## 2023-10-13 LAB
ALBUMIN SERPL-MCNC: 2.8 G/DL (ref 3.4–4.8)
ALBUMIN/GLOB SERPL: 0.8 RATIO (ref 1.1–2)
ALP SERPL-CCNC: 56 UNIT/L (ref 40–150)
ALT SERPL-CCNC: 11 UNIT/L (ref 0–55)
AST SERPL-CCNC: 20 UNIT/L (ref 5–34)
B PERT.PT PRMT NPH QL NAA+NON-PROBE: NOT DETECTED
BASOPHILS # BLD AUTO: 0.04 X10(3)/MCL
BASOPHILS NFR BLD AUTO: 0.2 %
BILIRUB SERPL-MCNC: 0.4 MG/DL
BUN SERPL-MCNC: 16.8 MG/DL (ref 8.4–25.7)
C PNEUM DNA NPH QL NAA+NON-PROBE: NOT DETECTED
CALCIUM SERPL-MCNC: 8.7 MG/DL (ref 8.8–10)
CHLORIDE SERPL-SCNC: 105 MMOL/L (ref 98–107)
CO2 SERPL-SCNC: 22 MMOL/L (ref 23–31)
CREAT SERPL-MCNC: 0.61 MG/DL (ref 0.73–1.18)
EOSINOPHIL # BLD AUTO: 0 X10(3)/MCL (ref 0–0.9)
EOSINOPHIL NFR BLD AUTO: 0 %
ERYTHROCYTE [DISTWIDTH] IN BLOOD BY AUTOMATED COUNT: 13.9 % (ref 11.5–17)
GFR SERPLBLD CREATININE-BSD FMLA CKD-EPI: >60 MLS/MIN/1.73/M2
GLOBULIN SER-MCNC: 3.5 GM/DL (ref 2.4–3.5)
GLUCOSE SERPL-MCNC: 153 MG/DL (ref 82–115)
HADV DNA NPH QL NAA+NON-PROBE: NOT DETECTED
HCOV 229E RNA NPH QL NAA+NON-PROBE: NOT DETECTED
HCOV HKU1 RNA NPH QL NAA+NON-PROBE: NOT DETECTED
HCOV NL63 RNA NPH QL NAA+NON-PROBE: NOT DETECTED
HCOV OC43 RNA NPH QL NAA+NON-PROBE: NOT DETECTED
HCT VFR BLD AUTO: 32 % (ref 42–52)
HGB BLD-MCNC: 10.4 G/DL (ref 14–18)
HMPV RNA NPH QL NAA+NON-PROBE: NOT DETECTED
HPIV1 RNA NPH QL NAA+NON-PROBE: NOT DETECTED
HPIV2 RNA NPH QL NAA+NON-PROBE: NOT DETECTED
HPIV3 RNA NPH QL NAA+NON-PROBE: NOT DETECTED
HPIV4 RNA NPH QL NAA+NON-PROBE: NOT DETECTED
IMM GRANULOCYTES # BLD AUTO: 0.19 X10(3)/MCL (ref 0–0.04)
IMM GRANULOCYTES NFR BLD AUTO: 0.7 %
LYMPHOCYTES # BLD AUTO: 0.56 X10(3)/MCL (ref 0.6–4.6)
LYMPHOCYTES NFR BLD AUTO: 2.2 %
M PNEUMO DNA NPH QL NAA+NON-PROBE: NOT DETECTED
MAGNESIUM SERPL-MCNC: 1.8 MG/DL (ref 1.6–2.6)
MCH RBC QN AUTO: 29.1 PG (ref 27–31)
MCHC RBC AUTO-ENTMCNC: 32.5 G/DL (ref 33–36)
MCV RBC AUTO: 89.4 FL (ref 80–94)
MONOCYTES # BLD AUTO: 0.45 X10(3)/MCL (ref 0.1–1.3)
MONOCYTES NFR BLD AUTO: 1.8 %
NEUTROPHILS # BLD AUTO: 24.17 X10(3)/MCL (ref 2.1–9.2)
NEUTROPHILS NFR BLD AUTO: 95.1 %
NRBC BLD AUTO-RTO: 0 %
PLATELET # BLD AUTO: 254 X10(3)/MCL (ref 130–400)
PMV BLD AUTO: 11.8 FL (ref 7.4–10.4)
POCT GLUCOSE: 125 MG/DL (ref 70–110)
POTASSIUM SERPL-SCNC: 3.9 MMOL/L (ref 3.5–5.1)
PROT SERPL-MCNC: 6.3 GM/DL (ref 5.8–7.6)
RBC # BLD AUTO: 3.58 X10(6)/MCL (ref 4.7–6.1)
RSV RNA NPH QL NAA+NON-PROBE: NOT DETECTED
RV+EV RNA NPH QL NAA+NON-PROBE: NOT DETECTED
SODIUM SERPL-SCNC: 135 MMOL/L (ref 136–145)
WBC # SPEC AUTO: 25.41 X10(3)/MCL (ref 4.5–11.5)

## 2023-10-13 PROCEDURE — 99900035 HC TECH TIME PER 15 MIN (STAT)

## 2023-10-13 PROCEDURE — 25000242 PHARM REV CODE 250 ALT 637 W/ HCPCS: Performed by: HOSPITALIST

## 2023-10-13 PROCEDURE — 25000003 PHARM REV CODE 250: Performed by: PHYSICIAN ASSISTANT

## 2023-10-13 PROCEDURE — 80053 COMPREHEN METABOLIC PANEL: CPT | Performed by: PHYSICIAN ASSISTANT

## 2023-10-13 PROCEDURE — 11000001 HC ACUTE MED/SURG PRIVATE ROOM

## 2023-10-13 PROCEDURE — 63600175 PHARM REV CODE 636 W HCPCS: Performed by: INTERNAL MEDICINE

## 2023-10-13 PROCEDURE — 94761 N-INVAS EAR/PLS OXIMETRY MLT: CPT

## 2023-10-13 PROCEDURE — 25000003 PHARM REV CODE 250: Performed by: INTERNAL MEDICINE

## 2023-10-13 PROCEDURE — 25000242 PHARM REV CODE 250 ALT 637 W/ HCPCS: Performed by: INTERNAL MEDICINE

## 2023-10-13 PROCEDURE — 63600175 PHARM REV CODE 636 W HCPCS: Performed by: HOSPITALIST

## 2023-10-13 PROCEDURE — 25000003 PHARM REV CODE 250: Performed by: HOSPITALIST

## 2023-10-13 PROCEDURE — 27000221 HC OXYGEN, UP TO 24 HOURS

## 2023-10-13 PROCEDURE — 63600175 PHARM REV CODE 636 W HCPCS: Performed by: PHYSICIAN ASSISTANT

## 2023-10-13 PROCEDURE — 94640 AIRWAY INHALATION TREATMENT: CPT

## 2023-10-13 PROCEDURE — 21400001 HC TELEMETRY ROOM

## 2023-10-13 PROCEDURE — 83735 ASSAY OF MAGNESIUM: CPT | Performed by: INTERNAL MEDICINE

## 2023-10-13 PROCEDURE — 85025 COMPLETE CBC W/AUTO DIFF WBC: CPT | Performed by: PHYSICIAN ASSISTANT

## 2023-10-13 RX ORDER — GUAIFENESIN 600 MG/1
1200 TABLET, EXTENDED RELEASE ORAL 2 TIMES DAILY
Status: DISCONTINUED | OUTPATIENT
Start: 2023-10-13 | End: 2023-10-17 | Stop reason: HOSPADM

## 2023-10-13 RX ORDER — IPRATROPIUM BROMIDE AND ALBUTEROL SULFATE 2.5; .5 MG/3ML; MG/3ML
3 SOLUTION RESPIRATORY (INHALATION) EVERY 4 HOURS
Status: DISCONTINUED | OUTPATIENT
Start: 2023-10-13 | End: 2023-10-15

## 2023-10-13 RX ORDER — FLUTICASONE FUROATE AND VILANTEROL 200; 25 UG/1; UG/1
1 POWDER RESPIRATORY (INHALATION) DAILY
Status: DISCONTINUED | OUTPATIENT
Start: 2023-10-13 | End: 2023-10-17 | Stop reason: HOSPADM

## 2023-10-13 RX ADMIN — CEFEPIME 2 G: 2 INJECTION, POWDER, FOR SOLUTION INTRAVENOUS at 10:10

## 2023-10-13 RX ADMIN — FLUTICASONE FUROATE AND VILANTEROL TRIFENATATE 1 PUFF: 200; 25 POWDER RESPIRATORY (INHALATION) at 04:10

## 2023-10-13 RX ADMIN — GUAIFENESIN 1200 MG: 600 TABLET, EXTENDED RELEASE ORAL at 04:10

## 2023-10-13 RX ADMIN — LEVALBUTEROL HYDROCHLORIDE 0.63 MG: 0.63 SOLUTION RESPIRATORY (INHALATION) at 08:10

## 2023-10-13 RX ADMIN — ASPIRIN 81 MG: 81 TABLET, COATED ORAL at 10:10

## 2023-10-13 RX ADMIN — FLUOXETINE 20 MG: 20 CAPSULE ORAL at 10:10

## 2023-10-13 RX ADMIN — IPRATROPIUM BROMIDE AND ALBUTEROL SULFATE 3 ML: 2.5; .5 SOLUTION RESPIRATORY (INHALATION) at 08:10

## 2023-10-13 RX ADMIN — FINASTERIDE 5 MG: 5 TABLET, FILM COATED ORAL at 10:10

## 2023-10-13 RX ADMIN — GUAIFENESIN 1200 MG: 600 TABLET, EXTENDED RELEASE ORAL at 10:10

## 2023-10-13 RX ADMIN — ENOXAPARIN SODIUM 40 MG: 40 INJECTION SUBCUTANEOUS at 10:10

## 2023-10-13 RX ADMIN — CEFTRIAXONE SODIUM 1 G: 1 INJECTION, POWDER, FOR SOLUTION INTRAMUSCULAR; INTRAVENOUS at 12:10

## 2023-10-13 RX ADMIN — TAMSULOSIN HYDROCHLORIDE 0.4 MG: 0.4 CAPSULE ORAL at 10:10

## 2023-10-13 RX ADMIN — ALPRAZOLAM 0.5 MG: 0.5 TABLET ORAL at 10:10

## 2023-10-13 RX ADMIN — CEFEPIME 2 G: 2 INJECTION, POWDER, FOR SOLUTION INTRAVENOUS at 04:10

## 2023-10-13 RX ADMIN — AZITHROMYCIN MONOHYDRATE 500 MG: 500 INJECTION, POWDER, LYOPHILIZED, FOR SOLUTION INTRAVENOUS at 01:10

## 2023-10-13 RX ADMIN — PREDNISONE 40 MG: 20 TABLET ORAL at 10:10

## 2023-10-13 NOTE — PLAN OF CARE
10/13/23 1336   Discharge Assessment   Assessment Type Discharge Planning Assessment   Confirmed/corrected address, phone number and insurance Yes   Confirmed Demographics Correct on Facesheet   Source of Information patient   When was your last doctors appointment? 06/15/23   Communicated ANGIE with patient/caregiver Date not available/Unable to determine   Reason For Admission SOB, fever, diarrhea   People in Home spouse   Do you expect to return to your current living situation? Yes   Do you have help at home or someone to help you manage your care at home? Yes   Who are your caregiver(s) and their phone number(s)? daren Zhao [phone]   Prior to hospitilization cognitive status: Alert/Oriented   Current cognitive status: Alert/Oriented   Walking or Climbing Stairs ambulation difficulty, requires equipment   Mobility Management has cane and RW uses prn   Dressing/Bathing bathing difficulty, requires equipment   Dressing/Bathing Management shower chair   Home Layout Able to live on 1st floor   Equipment Currently Used at Home cane, straight;walker, rolling;oxygen;other (see comments)  ([facility]  O2 provider)   Readmission within 30 days? No   Patient currently being followed by outpatient case management? No   Do you currently have service(s) that help you manage your care at home? No   Do you take prescription medications? Yes   Do you have any problems affording any of your prescribed medications? No   Is the patient taking medications as prescribed? yes   Who is going to help you get home at discharge? son or dgt   How do you get to doctors appointments? car, drives self;family or friend will provide   Are you on dialysis? No   Do you take coumadin? No   DME Needed Upon Discharge  none   Discharge Plan discussed with: Patient   Transition of Care Barriers None   Discharge Plan A Home   Discharge Plan B Home Health   Financial Resource Strain   How hard is it for you to pay for the very basics like  food, housing, medical care, and heating? Not very   Housing Stability   In the last 12 months, was there a time when you were not able to pay the mortgage or rent on time? N   In the last 12 months, was there a time when you did not have a steady place to sleep or slept in a shelter (including now)? N   Transportation Needs   In the past 12 months, has lack of transportation kept you from medical appointments or from getting medications? no   In the past 12 months, has lack of transportation kept you from meetings, work, or from getting things needed for daily living? No   Food Insecurity   Within the past 12 months, you worried that your food would run out before you got the money to buy more. Never true   Within the past 12 months, the food you bought just didn't last and you didn't have money to get more. Never true   Social Connections   In a typical week, how many times do you talk on the phone with family, friends, or neighbors? More than 3   How often do you get together with friends or relatives? Once   Are you , , , , never , or living with a partner?    Alcohol Use   Q1: How often do you have a drink containing alcohol? Monthly or l   Q2: How many drinks containing alcohol do you have on a typical day when you are drinking? 1 or 2   Q3: How often do you have six or more drinks on one occasion? Never     Pharmacy [facility];   Pt lives with his wife who recently was dc from hospital for htn and she has HH.  Pt states he is not interested in HH at this time. He states he manages well at home and has a great family support. 3 children live in [place], sister who lives across from him.  Dr Schaefer for his prostate health; Dr Rabago for Pulmonary

## 2023-10-13 NOTE — PROGRESS NOTES
[facility] [address] MEDICINE ~ PROGRESS NOTE        CHIEF COMPLAINT   Hospital follow up    HOSPITAL COURSE   Sher Sweeney is a 83 y.o. White male with a past medical history of hypertension, hyperlipidemia, COPD on 2 L O2 at home, panic order, essential tremors and prostate cancer receiving injections every 3 months. The patient presented to [facility] on 10/12/2023 with a primary complaint of shortness of breath.  Patient reports using oxygen at home intermittently at 2 L but has been having to wear it all the time over the last few days.  He reports normally having a cough with mucus production but has not been coughing much lately.  He feels as if his congestion is mostly in his chest.  He states his temperature reached a max of 101° F yesterday.  He denies complaints of chest pain, nausea and vomiting.  He has been using home nebulizer treatments without much relief in symptoms.  His pulmonologist is Dr. Rabago.     Upon presentation to the ED, temperature 98.1° F, heart rate 116, blood pressure 122/58, respiratory rate 30 and SpO2 95% on 2 L nasal cannula. Labs with WBC 29.2, H&H 7.8/36.2, potassium 3.1, , troponin less than 0.01.  ABG with pH 11.43, pCO2 36, PO2 110, pHC03 Influenza A and B and SARS-COV-2 PCR negative.  EKG atrial flutter with variable AV block and premature ventricular/aberrantly conducted complexes and nonspecific ST and T-wave abnormalities.  Chest x-ray with prominent interstitial markings in bilateral lower lobes increased in interval however may be related to worsening chronic lung process, superimposed early infectious process is not entirely excluded.    Today  Personally reviewed chest x-ray and bilateral lower lobe infiltrates which was not present on previous x-rays.  There was some question about chronic lung disease by radiologist.  Given acuity and leukocytosis we will go ahead and treat as pneumonia.  He stated that he had a fever of  101 at home.  Has a productive cough.  We will try to get a sputum sample.        OBJECTIVE/PHYSICAL EXAM     VITAL SIGNS (MOST RECENT):  Temp: 97.6 °F (36.4 °C) (10/13/23 1228)  Pulse: 103 (10/13/23 1228)  Resp: 20 (10/13/23 1228)  BP: 137/76 (10/13/23 1228)  SpO2: 97 % (10/13/23 1228) VITAL SIGNS (24 HOUR RANGE):  Temp:  [97.6 °F (36.4 °C)-97.8 °F (36.6 °C)] 97.6 °F (36.4 °C)  Pulse:  [] 103  Resp:  [20-24] 20  SpO2:  [94 %-99 %] 97 %  BP: (132-167)/(68-78) 137/76   GENERAL: In no acute distress, afebrile  HEENT:  CHEST:  Bibasilar crackle with significantly diminished breath sounds and prolonged expiratory with wheeze  HEART: S1, S2, no appreciable murmur  ABDOMEN: Soft, nontender, BS +  MSK: Warm, no lower extremity edema, no clubbing or cyanosis  NEUROLOGIC: Alert and oriented x4, moving all extremities with good strength   INTEGUMENTARY:  PSYCHIATRY:        ASSESSMENT/PLAN   Bilateral lower lobe community-acquired pneumonia  Sepsis   Acute hypoxemic respiratory failure requiring oxygen    History of: HTN, HLD, COPD 2 L home O2, panic disorder, prostate cancer receiving injections every 3 months, essential tremors      Obtain respiratory culture.  Follow-up respiratory panel.  Changed to cefepime azithromycin given significant COPD and lung changes.  Needs Pseudomonas coverage.  DuoNeb every 4 hours, prednisone 40 b.i.d.  Wean oxygen back to baseline 2 L as tolerated    DVT prophylaxis:  Lovenox 40    Anticipated discharge and disposition:   __________________________________________________________________________    LABS/MICRO/MEDS/DIAGNOSTICS       LABS  Recent Labs     10/13/23  0508   *   K 3.9   CHLORIDE 105   CO2 22*   BUN 16.8   CREATININE 0.61*   GLUCOSE 153*   CALCIUM 8.7*   ALKPHOS 56   AST 20   ALT 11   ALBUMIN 2.8*     Recent Labs     10/13/23  0508   WBC 25.41*   RBC 3.58*   HCT 32.0*   MCV 89.4          MICROBIOLOGY  Microbiology Results (last 7 days)       Procedure Component  "Value Units Date/Time    Blood Culture #1 **CANNOT BE ORDERED STAT** [[id number]]  (Normal) Collected: 10/12/23 1112    Order Status: Completed Specimen: Blood Updated: 10/13/23 1201     CULTURE, BLOOD ([facility]) No Growth At 24 Hours    Blood Culture #2 **CANNOT BE ORDERED STAT** [[id number]]  (Normal) Collected: 10/12/23 1112    Order Status: Completed Specimen: Blood Updated: 10/13/23 1201     CULTURE, BLOOD ([facility]) No Growth At 24 Hours               MEDICATIONS   ALPRAZolam  0.5 mg Oral Daily    aspirin  81 mg Oral Daily    azithromycin  500 mg Intravenous Q24H    cefTRIAXone (ROCEPHIN) IVPB  1 g Intravenous Q24H    enoxparin  40 mg Subcutaneous Daily    finasteride  5 mg Oral Daily    FLUoxetine  20 mg Oral Daily    levalbuterol  0.63 mg Nebulization Q8H    predniSONE  40 mg Oral BID    roflumilast  1 tablet Oral Daily    tamsulosin  1 capsule Oral Daily         INFUSIONS         DIAGNOSTIC TESTS  X-Ray Chest AP Portable   Final Result      Prominent interstitial markings of the bilateral lower lobes increased in the interval, however believed related to worsening chronic lung process.  Superimposed early infectious process is not entirely excluded.         Electronically signed by: Manoj Lentz   Date:    10/12/2023   Time:    11:13           No results found for: "EF"       NUTRITION STATUS  Patient meets ASPEN criteria for   malnutrition of   per RD assessment as evidenced by:                       A minimum of two characteristics is recommended for diagnosis of either severe or non-severe malnutrition.       Case related differential diagnoses have been reviewed; assessment and plan has been documented. I have personally reviewed the labs and test results that are currently available; I have reviewed the patients medication list. I have reviewed the consulting providers recommendations. I have reviewed or attempted to review medical records based upon their availability.  All of the patient's and/or family's " questions have been addressed and answered to the best of my ability.  I will continue to monitor closely and make adjustments to medical management as needed.  This document was created using M*Modal Fluency Direct.  Transcription errors may have been made.  Please contact me if any questions may rise regarding documentation to clarify transcription.        Rey Sutherland MD   Internal Medicine  Department of [facility] Medicine  [facility] - [address] [facility] Telemetry

## 2023-10-14 LAB — POCT GLUCOSE: 144 MG/DL (ref 70–110)

## 2023-10-14 PROCEDURE — 63600175 PHARM REV CODE 636 W HCPCS: Performed by: PHYSICIAN ASSISTANT

## 2023-10-14 PROCEDURE — 25000003 PHARM REV CODE 250: Performed by: PHYSICIAN ASSISTANT

## 2023-10-14 PROCEDURE — 63600175 PHARM REV CODE 636 W HCPCS: Performed by: HOSPITALIST

## 2023-10-14 PROCEDURE — 25000003 PHARM REV CODE 250: Performed by: HOSPITALIST

## 2023-10-14 PROCEDURE — 21400001 HC TELEMETRY ROOM

## 2023-10-14 PROCEDURE — 94640 AIRWAY INHALATION TREATMENT: CPT

## 2023-10-14 PROCEDURE — 99900035 HC TECH TIME PER 15 MIN (STAT)

## 2023-10-14 PROCEDURE — 25000003 PHARM REV CODE 250: Performed by: INTERNAL MEDICINE

## 2023-10-14 PROCEDURE — 27000221 HC OXYGEN, UP TO 24 HOURS

## 2023-10-14 PROCEDURE — 11000001 HC ACUTE MED/SURG PRIVATE ROOM

## 2023-10-14 PROCEDURE — 25000242 PHARM REV CODE 250 ALT 637 W/ HCPCS: Performed by: INTERNAL MEDICINE

## 2023-10-14 PROCEDURE — 87077 CULTURE AEROBIC IDENTIFY: CPT | Performed by: INTERNAL MEDICINE

## 2023-10-14 PROCEDURE — 63600175 PHARM REV CODE 636 W HCPCS: Performed by: INTERNAL MEDICINE

## 2023-10-14 PROCEDURE — 94761 N-INVAS EAR/PLS OXIMETRY MLT: CPT

## 2023-10-14 RX ADMIN — FINASTERIDE 5 MG: 5 TABLET, FILM COATED ORAL at 10:10

## 2023-10-14 RX ADMIN — ALPRAZOLAM 0.5 MG: 0.5 TABLET ORAL at 10:10

## 2023-10-14 RX ADMIN — PREDNISONE 40 MG: 20 TABLET ORAL at 10:10

## 2023-10-14 RX ADMIN — TIOTROPIUM BROMIDE INHALATION SPRAY 2 PUFF: 3.12 SPRAY, METERED RESPIRATORY (INHALATION) at 10:10

## 2023-10-14 RX ADMIN — GUAIFENESIN 1200 MG: 600 TABLET, EXTENDED RELEASE ORAL at 10:10

## 2023-10-14 RX ADMIN — CEFEPIME 2 G: 2 INJECTION, POWDER, FOR SOLUTION INTRAVENOUS at 02:10

## 2023-10-14 RX ADMIN — GUAIFENESIN 1200 MG: 600 TABLET, EXTENDED RELEASE ORAL at 09:10

## 2023-10-14 RX ADMIN — IPRATROPIUM BROMIDE AND ALBUTEROL SULFATE 3 ML: 2.5; .5 SOLUTION RESPIRATORY (INHALATION) at 12:10

## 2023-10-14 RX ADMIN — IPRATROPIUM BROMIDE AND ALBUTEROL SULFATE 3 ML: 2.5; .5 SOLUTION RESPIRATORY (INHALATION) at 04:10

## 2023-10-14 RX ADMIN — PREDNISONE 40 MG: 20 TABLET ORAL at 09:10

## 2023-10-14 RX ADMIN — CEFEPIME 2 G: 2 INJECTION, POWDER, FOR SOLUTION INTRAVENOUS at 07:10

## 2023-10-14 RX ADMIN — FLUTICASONE FUROATE AND VILANTEROL TRIFENATATE 1 PUFF: 200; 25 POWDER RESPIRATORY (INHALATION) at 10:10

## 2023-10-14 RX ADMIN — IPRATROPIUM BROMIDE AND ALBUTEROL SULFATE 3 ML: 2.5; .5 SOLUTION RESPIRATORY (INHALATION) at 08:10

## 2023-10-14 RX ADMIN — TAMSULOSIN HYDROCHLORIDE 0.4 MG: 0.4 CAPSULE ORAL at 10:10

## 2023-10-14 RX ADMIN — ASPIRIN 81 MG: 81 TABLET, COATED ORAL at 10:10

## 2023-10-14 RX ADMIN — AZITHROMYCIN MONOHYDRATE 500 MG: 500 INJECTION, POWDER, LYOPHILIZED, FOR SOLUTION INTRAVENOUS at 01:10

## 2023-10-14 RX ADMIN — FLUOXETINE 20 MG: 20 CAPSULE ORAL at 10:10

## 2023-10-14 RX ADMIN — ENOXAPARIN SODIUM 40 MG: 40 INJECTION SUBCUTANEOUS at 09:10

## 2023-10-14 NOTE — PLAN OF CARE
Problem: Adult Inpatient Plan of Care  Goal: Plan of Care Review  Outcome: Ongoing, Progressing  Goal: Patient-Specific Goal (Individualized)  Outcome: Ongoing, Progressing  Goal: Absence of Hospital-Acquired Illness or Injury  Outcome: Ongoing, Progressing  Goal: Optimal Comfort and Wellbeing  Outcome: Ongoing, Progressing  Goal: Readiness for Transition of Care  Outcome: Ongoing, Progressing     Problem: Infection (Pneumonia)  Goal: Resolution of Infection Signs and Symptoms  Outcome: Ongoing, Progressing     Problem: Respiratory Compromise (Pneumonia)  Goal: Effective Oxygenation and Ventilation  Outcome: Ongoing, Progressing

## 2023-10-14 NOTE — PLAN OF CARE
Problem: Adult Inpatient Plan of Care  Goal: Plan of Care Review  Outcome: Ongoing, Progressing  Goal: Patient-Specific Goal (Individualized)  Outcome: Ongoing, Progressing  Goal: Absence of Hospital-Acquired Illness or Injury  Outcome: Ongoing, Progressing  Goal: Optimal Comfort and Wellbeing  Outcome: Ongoing, Progressing     Problem: Infection (Pneumonia)  Goal: Resolution of Infection Signs and Symptoms  Outcome: Ongoing, Progressing     Problem: Respiratory Compromise (Pneumonia)  Goal: Effective Oxygenation and Ventilation  Outcome: Ongoing, Progressing

## 2023-10-14 NOTE — PROGRESS NOTES
[facility] [address] MEDICINE ~ PROGRESS NOTE        CHIEF COMPLAINT   Hospital follow up    HOSPITAL COURSE   Sher Sweeney is a 83 y.o. White male with a past medical history of hypertension, hyperlipidemia, COPD on 2 L O2 at home, panic order, essential tremors and prostate cancer receiving injections every 3 months. The patient presented to [facility] on 10/12/2023 with a primary complaint of shortness of breath.  Patient reports using oxygen at home intermittently at 2 L but has been having to wear it all the time over the last few days.  He reports normally having a cough with mucus production but has not been coughing much lately.  He feels as if his congestion is mostly in his chest.  He states his temperature reached a max of 101° F yesterday.  He denies complaints of chest pain, nausea and vomiting.  He has been using home nebulizer treatments without much relief in symptoms.  His pulmonologist is Dr. Rabago.     Upon presentation to the ED, temperature 98.1° F, heart rate 116, blood pressure 122/58, respiratory rate 30 and SpO2 95% on 2 L nasal cannula. Labs with WBC 29.2, H&H 7.8/36.2, potassium 3.1, , troponin less than 0.01.  ABG with pH 11.43, pCO2 36, PO2 110, pHC03 Influenza A and B and SARS-COV-2 PCR negative.  EKG atrial flutter with variable AV block and premature ventricular/aberrantly conducted complexes and nonspecific ST and T-wave abnormalities.  Chest x-ray with prominent interstitial markings in bilateral lower lobes increased in interval however may be related to worsening chronic lung process, superimposed early infectious process is not entirely excluded.    Today  Seen and examined this morning.  Still has a productive sounding cough but no sputum sample collected.  Oxygen saturations are doing much better today.  He is sounding better but still easily short of breath.        OBJECTIVE/PHYSICAL EXAM     VITAL SIGNS (MOST RECENT):  Temp: 98.2  °F (36.8 °C) (10/14/23 0700)  Pulse: 81 (10/14/23 1048)  Resp: 20 (10/14/23 1048)  BP: 133/76 (10/14/23 0700)  SpO2: 99 % (10/14/23 1048) VITAL SIGNS (24 HOUR RANGE):  Temp:  [97.5 °F (36.4 °C)-98.2 °F (36.8 °C)] 98.2 °F (36.8 °C)  Pulse:  [] 81  Resp:  [15-28] 20  SpO2:  [96 %-100 %] 99 %  BP: (124-154)/(55-76) 133/76   GENERAL: In no acute distress, afebrile  HEENT:  CHEST:  Improved air movement, diminished breath sounds and prolonged expiratory phase  HEART: S1, S2, no appreciable murmur  ABDOMEN: Soft, nontender, BS +  MSK: Warm, no lower extremity edema, no clubbing or cyanosis  NEUROLOGIC: Alert and oriented x4, moving all extremities with good strength   INTEGUMENTARY:  PSYCHIATRY:        ASSESSMENT/PLAN   Bilateral lower lobe community-acquired pneumonia  Sepsis   Acute hypoxemic respiratory failure requiring oxygen  Acute on chronic end-stage COPD exacerbation    History of: HTN, HLD, COPD 2 L home O2, panic disorder, prostate cancer receiving injections every 3 months, essential tremors      Obtain respiratory culture.  Follow-up respiratory panel.  Continue cefepime azithromycin given significant COPD and lung changes.  Needs Pseudomonas coverage.  DuoNeb every 4 hours, prednisone 40 b.i.d.  Wean oxygen as tolerated    DVT prophylaxis:  Lovenox 40    Anticipated discharge and disposition:  Hope to discharge in the next 24 to 48 hours probably discharge on Monday  __________________________________________________________________________    LABS/MICRO/MEDS/DIAGNOSTICS       LABS  Recent Labs     10/13/23  0508   *   K 3.9   CHLORIDE 105   CO2 22*   BUN 16.8   CREATININE 0.61*   GLUCOSE 153*   CALCIUM 8.7*   ALKPHOS 56   AST 20   ALT 11   ALBUMIN 2.8*       Recent Labs     10/13/23  0508   WBC 25.41*   RBC 3.58*   HCT 32.0*   MCV 89.4            MICROBIOLOGY  Microbiology Results (last 7 days)       Procedure Component Value Units Date/Time    Respiratory Culture [[id number]] Collected:  "10/14/23 1020    Order Status: Sent Specimen: Sputum Updated: 10/14/23 1020    Blood Culture #1 **CANNOT BE ORDERED STAT** [[id number]]  (Normal) Collected: 10/12/23 1112    Order Status: Completed Specimen: Blood Updated: 10/13/23 1201     CULTURE, BLOOD ([facility]) No Growth At 24 Hours    Blood Culture #2 **CANNOT BE ORDERED STAT** [[id number]]  (Normal) Collected: 10/12/23 1112    Order Status: Completed Specimen: Blood Updated: 10/13/23 1201     CULTURE, BLOOD ([facility]) No Growth At 24 Hours               MEDICATIONS   albuterol-ipratropium  3 mL Nebulization Q4H    ALPRAZolam  0.5 mg Oral Daily    aspirin  81 mg Oral Daily    azithromycin  500 mg Intravenous Q24H    ceFEPime (MAXIPIME) IVPB  2 g Intravenous Q8H    enoxparin  40 mg Subcutaneous Daily    finasteride  5 mg Oral Daily    FLUoxetine  20 mg Oral Daily    fluticasone furoate-vilanteroL  1 puff Inhalation Daily    guaiFENesin  1,200 mg Oral BID    predniSONE  40 mg Oral BID    roflumilast  1 tablet Oral Daily    tamsulosin  1 capsule Oral Daily    tiotropium bromide  2 puff Inhalation Daily         INFUSIONS         DIAGNOSTIC TESTS  X-Ray Chest AP Portable   Final Result      Prominent interstitial markings of the bilateral lower lobes increased in the interval, however believed related to worsening chronic lung process.  Superimposed early infectious process is not entirely excluded.         Electronically signed by: Manoj Lentz   Date:    10/12/2023   Time:    11:13           No results found for: "EF"       NUTRITION STATUS  Patient meets ASPEN criteria for   malnutrition of   per RD assessment as evidenced by:                       A minimum of two characteristics is recommended for diagnosis of either severe or non-severe malnutrition.       Case related differential diagnoses have been reviewed; assessment and plan has been documented. I have personally reviewed the labs and test results that are currently available; I have reviewed the patients " medication list. I have reviewed the consulting providers recommendations. I have reviewed or attempted to review medical records based upon their availability.  All of the patient's and/or family's questions have been addressed and answered to the best of my ability.  I will continue to monitor closely and make adjustments to medical management as needed.  This document was created using M*Modal Fluency Direct.  Transcription errors may have been made.  Please contact me if any questions may rise regarding documentation to clarify transcription.        Rey Sutherland MD   Internal Medicine  Department of Hospital Medicine  [facility] [address] [facility] Tele[facility]

## 2023-10-15 LAB
ANION GAP SERPL CALC-SCNC: 9 MEQ/L
BASOPHILS # BLD AUTO: 0.01 X10(3)/MCL
BASOPHILS NFR BLD AUTO: 0.1 %
BUN SERPL-MCNC: 15.3 MG/DL (ref 8.4–25.7)
CALCIUM SERPL-MCNC: 8.9 MG/DL (ref 8.8–10)
CHLORIDE SERPL-SCNC: 105 MMOL/L (ref 98–107)
CO2 SERPL-SCNC: 23 MMOL/L (ref 23–31)
CREAT SERPL-MCNC: 0.63 MG/DL (ref 0.73–1.18)
CREAT/UREA NIT SERPL: 24
EOSINOPHIL # BLD AUTO: 0 X10(3)/MCL (ref 0–0.9)
EOSINOPHIL NFR BLD AUTO: 0 %
ERYTHROCYTE [DISTWIDTH] IN BLOOD BY AUTOMATED COUNT: 13.6 % (ref 11.5–17)
GFR SERPLBLD CREATININE-BSD FMLA CKD-EPI: >60 MLS/MIN/1.73/M2
GLUCOSE SERPL-MCNC: 140 MG/DL (ref 82–115)
HCT VFR BLD AUTO: 33.1 % (ref 42–52)
HGB BLD-MCNC: 10.6 G/DL (ref 14–18)
IMM GRANULOCYTES # BLD AUTO: 0.07 X10(3)/MCL (ref 0–0.04)
IMM GRANULOCYTES NFR BLD AUTO: 0.4 %
LYMPHOCYTES # BLD AUTO: 0.61 X10(3)/MCL (ref 0.6–4.6)
LYMPHOCYTES NFR BLD AUTO: 3.7 %
MCH RBC QN AUTO: 29.2 PG (ref 27–31)
MCHC RBC AUTO-ENTMCNC: 32 G/DL (ref 33–36)
MCV RBC AUTO: 91.2 FL (ref 80–94)
MONOCYTES # BLD AUTO: 0.32 X10(3)/MCL (ref 0.1–1.3)
MONOCYTES NFR BLD AUTO: 1.9 %
NEUTROPHILS # BLD AUTO: 15.68 X10(3)/MCL (ref 2.1–9.2)
NEUTROPHILS NFR BLD AUTO: 93.9 %
NRBC BLD AUTO-RTO: 0 %
PLATELET # BLD AUTO: 262 X10(3)/MCL (ref 130–400)
PMV BLD AUTO: 11.5 FL (ref 7.4–10.4)
POTASSIUM SERPL-SCNC: 4.1 MMOL/L (ref 3.5–5.1)
RBC # BLD AUTO: 3.63 X10(6)/MCL (ref 4.7–6.1)
SODIUM SERPL-SCNC: 137 MMOL/L (ref 136–145)
WBC # SPEC AUTO: 16.69 X10(3)/MCL (ref 4.5–11.5)

## 2023-10-15 PROCEDURE — 21400001 HC TELEMETRY ROOM

## 2023-10-15 PROCEDURE — 80048 BASIC METABOLIC PNL TOTAL CA: CPT | Performed by: INTERNAL MEDICINE

## 2023-10-15 PROCEDURE — 63600175 PHARM REV CODE 636 W HCPCS: Performed by: HOSPITALIST

## 2023-10-15 PROCEDURE — 25000003 PHARM REV CODE 250: Performed by: HOSPITALIST

## 2023-10-15 PROCEDURE — 94761 N-INVAS EAR/PLS OXIMETRY MLT: CPT

## 2023-10-15 PROCEDURE — 99900035 HC TECH TIME PER 15 MIN (STAT)

## 2023-10-15 PROCEDURE — 25000003 PHARM REV CODE 250: Performed by: INTERNAL MEDICINE

## 2023-10-15 PROCEDURE — 25000242 PHARM REV CODE 250 ALT 637 W/ HCPCS: Performed by: INTERNAL MEDICINE

## 2023-10-15 PROCEDURE — 94640 AIRWAY INHALATION TREATMENT: CPT

## 2023-10-15 PROCEDURE — 63600175 PHARM REV CODE 636 W HCPCS: Performed by: INTERNAL MEDICINE

## 2023-10-15 PROCEDURE — 63600175 PHARM REV CODE 636 W HCPCS: Performed by: PHYSICIAN ASSISTANT

## 2023-10-15 PROCEDURE — 85025 COMPLETE CBC W/AUTO DIFF WBC: CPT | Performed by: INTERNAL MEDICINE

## 2023-10-15 PROCEDURE — 25000003 PHARM REV CODE 250: Performed by: PHYSICIAN ASSISTANT

## 2023-10-15 PROCEDURE — 27000221 HC OXYGEN, UP TO 24 HOURS

## 2023-10-15 RX ORDER — IPRATROPIUM BROMIDE AND ALBUTEROL SULFATE 2.5; .5 MG/3ML; MG/3ML
3 SOLUTION RESPIRATORY (INHALATION)
Status: DISCONTINUED | OUTPATIENT
Start: 2023-10-15 | End: 2023-10-17 | Stop reason: HOSPADM

## 2023-10-15 RX ORDER — AMOXICILLIN 250 MG
2 CAPSULE ORAL 2 TIMES DAILY
Status: DISCONTINUED | OUTPATIENT
Start: 2023-10-15 | End: 2023-10-17 | Stop reason: HOSPADM

## 2023-10-15 RX ORDER — PREDNISONE 20 MG/1
40 TABLET ORAL DAILY
Status: DISCONTINUED | OUTPATIENT
Start: 2023-10-16 | End: 2023-10-17 | Stop reason: HOSPADM

## 2023-10-15 RX ADMIN — CEFEPIME 2 G: 2 INJECTION, POWDER, FOR SOLUTION INTRAVENOUS at 12:10

## 2023-10-15 RX ADMIN — GUAIFENESIN 1200 MG: 600 TABLET, EXTENDED RELEASE ORAL at 08:10

## 2023-10-15 RX ADMIN — FLUOXETINE 20 MG: 20 CAPSULE ORAL at 09:10

## 2023-10-15 RX ADMIN — ENOXAPARIN SODIUM 40 MG: 40 INJECTION SUBCUTANEOUS at 03:10

## 2023-10-15 RX ADMIN — CEFEPIME 2 G: 2 INJECTION, POWDER, FOR SOLUTION INTRAVENOUS at 11:10

## 2023-10-15 RX ADMIN — FLUTICASONE FUROATE AND VILANTEROL TRIFENATATE 1 PUFF: 200; 25 POWDER RESPIRATORY (INHALATION) at 09:10

## 2023-10-15 RX ADMIN — CEFEPIME 2 G: 2 INJECTION, POWDER, FOR SOLUTION INTRAVENOUS at 03:10

## 2023-10-15 RX ADMIN — IPRATROPIUM BROMIDE AND ALBUTEROL SULFATE 3 ML: 2.5; .5 SOLUTION RESPIRATORY (INHALATION) at 08:10

## 2023-10-15 RX ADMIN — CEFEPIME 2 G: 2 INJECTION, POWDER, FOR SOLUTION INTRAVENOUS at 09:10

## 2023-10-15 RX ADMIN — ALPRAZOLAM 0.5 MG: 0.5 TABLET ORAL at 09:10

## 2023-10-15 RX ADMIN — TAMSULOSIN HYDROCHLORIDE 0.4 MG: 0.4 CAPSULE ORAL at 09:10

## 2023-10-15 RX ADMIN — SENNOSIDES AND DOCUSATE SODIUM 2 TABLET: 50; 8.6 TABLET ORAL at 08:10

## 2023-10-15 RX ADMIN — AZITHROMYCIN MONOHYDRATE 500 MG: 500 INJECTION, POWDER, LYOPHILIZED, FOR SOLUTION INTRAVENOUS at 01:10

## 2023-10-15 RX ADMIN — FINASTERIDE 5 MG: 5 TABLET, FILM COATED ORAL at 09:10

## 2023-10-15 RX ADMIN — IPRATROPIUM BROMIDE AND ALBUTEROL SULFATE 3 ML: 2.5; .5 SOLUTION RESPIRATORY (INHALATION) at 11:10

## 2023-10-15 RX ADMIN — TIOTROPIUM BROMIDE INHALATION SPRAY 2 PUFF: 3.12 SPRAY, METERED RESPIRATORY (INHALATION) at 09:10

## 2023-10-15 RX ADMIN — IPRATROPIUM BROMIDE AND ALBUTEROL SULFATE 3 ML: 2.5; .5 SOLUTION RESPIRATORY (INHALATION) at 12:10

## 2023-10-15 RX ADMIN — IPRATROPIUM BROMIDE AND ALBUTEROL SULFATE 3 ML: 2.5; .5 SOLUTION RESPIRATORY (INHALATION) at 04:10

## 2023-10-15 RX ADMIN — SENNOSIDES AND DOCUSATE SODIUM 2 TABLET: 50; 8.6 TABLET ORAL at 11:10

## 2023-10-15 RX ADMIN — PREDNISONE 40 MG: 20 TABLET ORAL at 09:10

## 2023-10-15 RX ADMIN — GUAIFENESIN 1200 MG: 600 TABLET, EXTENDED RELEASE ORAL at 09:10

## 2023-10-15 NOTE — PROGRESS NOTES
[facility] [address] MEDICINE ~ PROGRESS NOTE        CHIEF COMPLAINT   Hospital follow up    HOSPITAL COURSE   Sher Sweeney is a 83 y.o. White male with a past medical history of hypertension, hyperlipidemia, COPD on 2 L O2 at home, panic order, essential tremors and prostate cancer receiving injections every 3 months. The patient presented to [facility] on 10/12/2023 with a primary complaint of shortness of breath.  Patient reports using oxygen at home intermittently at 2 L but has been having to wear it all the time over the last few days.  He reports normally having a cough with mucus production but has not been coughing much lately.  He feels as if his congestion is mostly in his chest.  He states his temperature reached a max of 101° F yesterday.  He denies complaints of chest pain, nausea and vomiting.  He has been using home nebulizer treatments without much relief in symptoms.  His pulmonologist is Dr. Rabago.     Upon presentation to the ED, temperature 98.1° F, heart rate 116, blood pressure 122/58, respiratory rate 30 and SpO2 95% on 2 L nasal cannula. Labs with WBC 29.2, H&H 7.8/36.2, potassium 3.1, , troponin less than 0.01.  ABG with pH 11.43, pCO2 36, PO2 110, pHC03 Influenza A and B and SARS-COV-2 PCR negative.  EKG atrial flutter with variable AV block and premature ventricular/aberrantly conducted complexes and nonspecific ST and T-wave abnormalities.  Chest x-ray with prominent interstitial markings in bilateral lower lobes increased in interval however may be related to worsening chronic lung process, superimposed early infectious process is not entirely excluded.    Today  Doing much better today, getting nebulizing treatment.  WBC 16.6.        OBJECTIVE/PHYSICAL EXAM     VITAL SIGNS (MOST RECENT):  Temp: 97.5 °F (36.4 °C) (10/15/23 0744)  Pulse: (!) 58 (10/15/23 0919)  Resp: (!) 22 (10/15/23 0919)  BP: (!) 147/74 (10/15/23 0744)  SpO2: 98 % (10/15/23  0801) VITAL SIGNS (24 HOUR RANGE):  Temp:  [97.5 °F (36.4 °C)-98.2 °F (36.8 °C)] 97.5 °F (36.4 °C)  Pulse:  [58-96] 58  Resp:  [18-23] 22  SpO2:  [96 %-100 %] 98 %  BP: (129-147)/(67-76) 147/74   GENERAL: In no acute distress, afebrile  HEENT:  CHEST:  Improved air movement, diminished breath sounds and prolonged expiratory phase  HEART: S1, S2, no appreciable murmur  ABDOMEN: Soft, nontender, BS +  MSK: Warm, no lower extremity edema, no clubbing or cyanosis  NEUROLOGIC: Alert and oriented x4, moving all extremities with good strength   INTEGUMENTARY:  PSYCHIATRY:        ASSESSMENT/PLAN   Bilateral lower lobe community-acquired pneumonia  Sepsis   Acute hypoxemic respiratory failure requiring oxygen  Acute on chronic end-stage COPD exacerbation    History of: HTN, HLD, COPD 2 L home O2, panic disorder, prostate cancer receiving injections every 3 months, essential tremors      Follow respiratory culture.  Continue cefepime azithromycin given significant COPD and lung changes.  Needs Pseudomonas coverage.  DuoNeb every 4 hours, prednisone 40 daily  Wean oxygen as tolerated    DVT prophylaxis:  Lovenox 40    Anticipated discharge and disposition:  Probably discharge tomorrow morning  __________________________________________________________________________    LABS/MICRO/MEDS/DIAGNOSTICS       LABS  Recent Labs     10/13/23  0508 10/15/23  0451   * 137   K 3.9 4.1   CHLORIDE 105 105   CO2 22* 23   BUN 16.8 15.3   CREATININE 0.61* 0.63*   GLUCOSE 153* 140*   CALCIUM 8.7* 8.9   ALKPHOS 56  --    AST 20  --    ALT 11  --    ALBUMIN 2.8*  --        Recent Labs     10/15/23  0451   WBC 16.69*   RBC 3.63*   HCT 33.1*   MCV 91.2            MICROBIOLOGY  Microbiology Results (last 7 days)       Procedure Component Value Units Date/Time    Respiratory Culture [[id number]] Collected: 10/14/23 1020    Order Status: Completed Specimen: Sputum Updated: 10/15/23 0652     Respiratory Culture Rare normal respiratory  "sima     GRAM STAIN Quality 3+      Rare Gram positive cocci      Rare Gram Negative Rods    Blood Culture #1 **CANNOT BE ORDERED STAT** [[id number]]  (Normal) Collected: 10/12/23 1112    Order Status: Completed Specimen: Blood Updated: 10/14/23 1200     CULTURE, BLOOD ([facility]) No Growth At 48 Hours    Blood Culture #2 **CANNOT BE ORDERED STAT** [[id number]]  (Normal) Collected: 10/12/23 1112    Order Status: Completed Specimen: Blood Updated: 10/14/23 1200     CULTURE, BLOOD ([facility]) No Growth At 48 Hours               MEDICATIONS   albuterol-ipratropium  3 mL Nebulization Q4H    ALPRAZolam  0.5 mg Oral Daily    aspirin  81 mg Oral Daily    azithromycin  500 mg Intravenous Q24H    ceFEPime (MAXIPIME) IVPB  2 g Intravenous Q8H    enoxparin  40 mg Subcutaneous Daily    finasteride  5 mg Oral Daily    FLUoxetine  20 mg Oral Daily    fluticasone furoate-vilanteroL  1 puff Inhalation Daily    guaiFENesin  1,200 mg Oral BID    predniSONE  40 mg Oral BID    roflumilast  1 tablet Oral Daily    tamsulosin  1 capsule Oral Daily    tiotropium bromide  2 puff Inhalation Daily         INFUSIONS         DIAGNOSTIC TESTS  X-Ray Chest AP Portable   Final Result      Prominent interstitial markings of the bilateral lower lobes increased in the interval, however believed related to worsening chronic lung process.  Superimposed early infectious process is not entirely excluded.         Electronically signed by: Manoj Lentz   Date:    10/12/2023   Time:    11:13           No results found for: "EF"       NUTRITION STATUS  Patient meets ASPEN criteria for   malnutrition of   per RD assessment as evidenced by:                       A minimum of two characteristics is recommended for diagnosis of either severe or non-severe malnutrition.       Case related differential diagnoses have been reviewed; assessment and plan has been documented. I have personally reviewed the labs and test results that are currently available; I have reviewed the " patients medication list. I have reviewed the consulting providers recommendations. I have reviewed or attempted to review medical records based upon their availability.  All of the patient's and/or family's questions have been addressed and answered to the best of my ability.  I will continue to monitor closely and make adjustments to medical management as needed.  This document was created using M*Modal Fluency Direct.  Transcription errors may have been made.  Please contact me if any questions may rise regarding documentation to clarify transcription.        Rey Sutherland MD   Internal Medicine  Department of Hospital Medicine [facility] - [address] [facility] Tele[facility]

## 2023-10-15 NOTE — PLAN OF CARE
Problem: Adult Inpatient Plan of Care  Goal: Plan of Care Review  Outcome: Ongoing, Progressing  Goal: Patient-Specific Goal (Individualized)  Outcome: Ongoing, Progressing  Goal: Absence of Hospital-Acquired Illness or Injury  Outcome: Ongoing, Progressing  Goal: Optimal Comfort and Wellbeing  Outcome: Ongoing, Progressing  Goal: Readiness for Transition of Care  Outcome: Ongoing, Progressing     Problem: Respiratory Compromise (Pneumonia)  Goal: Effective Oxygenation and Ventilation  Outcome: Ongoing, Progressing

## 2023-10-16 PROCEDURE — 63600175 PHARM REV CODE 636 W HCPCS: Performed by: PHYSICIAN ASSISTANT

## 2023-10-16 PROCEDURE — 25000003 PHARM REV CODE 250: Performed by: HOSPITALIST

## 2023-10-16 PROCEDURE — 63600175 PHARM REV CODE 636 W HCPCS: Performed by: INTERNAL MEDICINE

## 2023-10-16 PROCEDURE — 25000003 PHARM REV CODE 250: Performed by: INTERNAL MEDICINE

## 2023-10-16 PROCEDURE — 25000003 PHARM REV CODE 250: Performed by: PHYSICIAN ASSISTANT

## 2023-10-16 PROCEDURE — 99900035 HC TECH TIME PER 15 MIN (STAT)

## 2023-10-16 PROCEDURE — 94761 N-INVAS EAR/PLS OXIMETRY MLT: CPT

## 2023-10-16 PROCEDURE — 25000242 PHARM REV CODE 250 ALT 637 W/ HCPCS: Performed by: INTERNAL MEDICINE

## 2023-10-16 PROCEDURE — 27000221 HC OXYGEN, UP TO 24 HOURS

## 2023-10-16 PROCEDURE — 94640 AIRWAY INHALATION TREATMENT: CPT

## 2023-10-16 PROCEDURE — 21400001 HC TELEMETRY ROOM

## 2023-10-16 RX ADMIN — IPRATROPIUM BROMIDE AND ALBUTEROL SULFATE 3 ML: 2.5; .5 SOLUTION RESPIRATORY (INHALATION) at 08:10

## 2023-10-16 RX ADMIN — TAMSULOSIN HYDROCHLORIDE 0.4 MG: 0.4 CAPSULE ORAL at 08:10

## 2023-10-16 RX ADMIN — CEFEPIME 2 G: 2 INJECTION, POWDER, FOR SOLUTION INTRAVENOUS at 08:10

## 2023-10-16 RX ADMIN — GUAIFENESIN 1200 MG: 600 TABLET, EXTENDED RELEASE ORAL at 08:10

## 2023-10-16 RX ADMIN — FINASTERIDE 5 MG: 5 TABLET, FILM COATED ORAL at 08:10

## 2023-10-16 RX ADMIN — ENOXAPARIN SODIUM 40 MG: 40 INJECTION SUBCUTANEOUS at 04:10

## 2023-10-16 RX ADMIN — IPRATROPIUM BROMIDE AND ALBUTEROL SULFATE 3 ML: 2.5; .5 SOLUTION RESPIRATORY (INHALATION) at 12:10

## 2023-10-16 RX ADMIN — CEFEPIME 2 G: 2 INJECTION, POWDER, FOR SOLUTION INTRAVENOUS at 04:10

## 2023-10-16 RX ADMIN — TIOTROPIUM BROMIDE INHALATION SPRAY 2 PUFF: 3.12 SPRAY, METERED RESPIRATORY (INHALATION) at 08:10

## 2023-10-16 RX ADMIN — FLUTICASONE FUROATE AND VILANTEROL TRIFENATATE 1 PUFF: 200; 25 POWDER RESPIRATORY (INHALATION) at 08:10

## 2023-10-16 RX ADMIN — PREDNISONE 40 MG: 20 TABLET ORAL at 08:10

## 2023-10-16 RX ADMIN — AZITHROMYCIN MONOHYDRATE 500 MG: 500 INJECTION, POWDER, LYOPHILIZED, FOR SOLUTION INTRAVENOUS at 12:10

## 2023-10-16 RX ADMIN — SENNOSIDES AND DOCUSATE SODIUM 2 TABLET: 50; 8.6 TABLET ORAL at 08:10

## 2023-10-16 RX ADMIN — ALPRAZOLAM 0.5 MG: 0.5 TABLET ORAL at 08:10

## 2023-10-16 RX ADMIN — ASPIRIN 81 MG: 81 TABLET, COATED ORAL at 08:10

## 2023-10-16 RX ADMIN — CEFEPIME 2 G: 2 INJECTION, POWDER, FOR SOLUTION INTRAVENOUS at 10:10

## 2023-10-16 RX ADMIN — FLUOXETINE 20 MG: 20 CAPSULE ORAL at 08:10

## 2023-10-16 RX ADMIN — IPRATROPIUM BROMIDE AND ALBUTEROL SULFATE 3 ML: 2.5; .5 SOLUTION RESPIRATORY (INHALATION) at 04:10

## 2023-10-16 NOTE — PHYSICIAN QUERY
PT Name: Sher Sweeney  MR #: [id number]     DOCUMENTATION CLARIFICATION     CDS: Dayana Horne RN, CCDS   Contact Information: [email or web address]    This form is a permanent document in the medical record.     Query Date: October 16, 2023    By submitting this query, we are merely seeking further clarification of documentation.  Please utilize your independent clinical judgment when addressing the question(s) below.  The Medical Record contains the following   Indicators   Supporting Clinical Findings Location in Medical Record   x Respiratory failure Acute hypoxemic respiratory failure  10/12-10/16 Hospital Medicine   x Home O2, Oxygen Dependence He is on chronic home O2, usually between 2-3 L 10/12 ED   x Subjective Respiratory Signs/Symptoms: SOB, MARTINEZ, Cough, etc. Positive for cough, sputum production, shortness of breath and wheezing 10/12 ED   x Objective Respiratory Signs/Symptoms: Respiratory distress, Accessory muscle use, tachypnea, wheezing, etc. Coarse breath sounds to right lung.  Clear to auscultation left lung.  On Vapotherm with 30 liters/minute and FiO2 40% with oxygen saturation 96-97%. 10/12 H&P   x RR         O2 sat         O2 use 10/12 1040: R 30, SpO2 95% on 2L via Nasal Cannula  10/13 0836: R 24, SpO2 98% on 2L via Nasal Cannula  10/14 1201: R 22, SpO2 96% on 0.5L via Nasal Cannula  10/15 2023: R 20, SpO2 98% on 1L via Nasal Cannula  10/16 0843: R 20, SpO2 100% on 1L via Nasal Cannula Flowsheets   x Blood Gas (ABG or VBG)  10/12/23 13:00   Sample Type Arterial Blood   POC PH 7.430   POC PCO2 36.0   POC PO2 110.0 (H)   POC HCO3 23.9   Oxygen Device, Blood gas Oxy Mask   LPM 3    Labs   x Acute/Chronic Illness 83 y.o. White male with a past medical history of hypertension, hyperlipidemia, COPD on 2 L O2 at home, panic order, essential tremors and prostate cancer receiving injections every 3 months. The patient presented to [facility] on 10/12/2023 with a primary complaint of shortness of  breath.  10/12 H&P   x Treatment - Continue with supplemental oxygen, weaning as tolerated   - Continue with Rocephin and azithromycin   - Xopenex as needed for shortness of breath and wheezing 10/12 H&P       The clinical guidelines noted are only a system guideline. It does not replace the providers clinical judgment.      [facility] Approved Diagnostic Criteria      Acute Respiratory Failure    Hypoxic: ABG pO2<60 mmHg or O2 sat of <91% on RA   AND/OR   Hypercapnic: ABG pCO2>50 mmHg with pH <7.35   AND   Respiratory symptoms documented (Subjective: SOB; Objective: Tachypnea, respiratory distress, increased work of breathing, unable to speak in complete sentences, labored breathing, use of accessory muscles, RR>26, cyanosis, dyspnea, wheezing, stridor, lethargy)      Chronic Respiratory Failure   Hypoxic: Continuous home oxygen    AND/OR   Hypercapnic: Normal pH with high CO2 (ex. COPD)      Acute on Chronic Respiratory Failure   Hypoxic: ABG pO2>10mmHg below baseline OR ABG pO2<60 mmHg OR SpO2<91% on usual home O2  OR O2>2L/min over baseline home O2   AND/OR   Hypercapnic: ABG pCO2>50 mmHg OR pCO2>10mmHg over baseline and pH <7.35   AND   Respiratory symptoms documented      Acute Respiratory Distress Syndrome (ARDS) - an acute, diffuse, inflammatory form of lung injury. Suspect with progressive dyspnea, hypoxemic respiratory failure, and bilateral alveolar infiltrates on chest imaging within 6 to 72 hours of an inciting event.   Acute Respiratory Distress - Generally describes less severe respiratory symptoms (tachypnea, in respiratory distress, increased work of breathing, unable to speak in complete sentences, labored breathing, use of accessory muscles, RR> 24, cyanosis, dyspnea, wheezing, stridor, lethargy) without sufficient measurements (pO2, SpO2, pH, and pCO2) to meet criteria for respiratory failure)   Acute Respiratory Insufficiency - Generally describes less severe respiratory symptoms  and measurements (pO2, SpO2, pH, and pCO2) not meeting criteria for respiratory failure         Provider, due to conflicting clinical picture, please clarify the acuity of the documented Hypoxemic Respiratory Failure:    [   x ] Chronic Hypoxemic Respiratory Failure; patient at or below baseline oxygen requirements   [    ] Acute Hypoxemic Respiratory Failure    Specify supporting clinical criteria to support the diagnosis: _______________________________   [    ] Acute and (on) Chronic Hypoxemic Respiratory Failure    Specify supporting clinical criteria to support the diagnosis:_______________________________     [    ] Other clarification (please specify): _________________   [   ] Clinically Undetermined       Please document in your progress notes daily for the duration of treatment until resolved and include in your discharge summary.     Reference:    SAMANTHA Hernandez MD. (2020, March 13). Acute respiratory distress syndrome: Clinical features, diagnosis, and complications in adults ([id number] CAN Alves MD & [id number] SHELBY Alamo MD, Eds.). Retrieved November 13, 2020, from [email or web address]://[email or web address]-respiratory-distress-syndrome-clinical-features-diagnosis-and-complications-in-adults?search=ards&source=search_result&selectedTitle=1~150&usage_type=default&display_rank=1  Form No. 12530

## 2023-10-16 NOTE — PROGRESS NOTES
[facility] [address] MEDICINE ~ PROGRESS NOTE        CHIEF COMPLAINT   Hospital follow up    HOSPITAL COURSE   Sher Sweeney is a 83 y.o. White male with a past medical history of hypertension, hyperlipidemia, COPD on 2 L O2 at home, panic order, essential tremors and prostate cancer receiving injections every 3 months. The patient presented to [facility] on 10/12/2023 with a primary complaint of shortness of breath.  Patient reports using oxygen at home intermittently at 2 L but has been having to wear it all the time over the last few days.  He reports normally having a cough with mucus production but has not been coughing much lately.  He feels as if his congestion is mostly in his chest.  He states his temperature reached a max of 101° F yesterday.  He denies complaints of chest pain, nausea and vomiting.  He has been using home nebulizer treatments without much relief in symptoms.  His pulmonologist is Dr. Rabago.     Upon presentation to the ED, temperature 98.1° F, heart rate 116, blood pressure 122/58, respiratory rate 30 and SpO2 95% on 2 L nasal cannula. Labs with WBC 29.2, H&H 7.8/36.2, potassium 3.1, , troponin less than 0.01.  ABG with pH 11.43, pCO2 36, PO2 110, pHC03 Influenza A and B and SARS-COV-2 PCR negative.  EKG atrial flutter with variable AV block and premature ventricular/aberrantly conducted complexes and nonspecific ST and T-wave abnormalities.  Chest x-ray with prominent interstitial markings in bilateral lower lobes increased in interval however may be related to worsening chronic lung process, superimposed early infectious process is not entirely excluded.    Today  He was short of breath after he got back from the bathroom with tachypnea.  Respiratory culture is coming back positive for many GNR, we will wait for the culture to finalize before discharging.        OBJECTIVE/PHYSICAL EXAM     VITAL SIGNS (MOST RECENT):  Temp: 98.2 °F (36.8 °C)  (10/16/23 0843)  Pulse: 98 (10/16/23 0843)  Resp: 20 (10/16/23 0843)  BP: (!) 156/78 (10/16/23 0843)  SpO2: 100 % (10/16/23 0843) VITAL SIGNS (24 HOUR RANGE):  Temp:  [97.4 °F (36.3 °C)-98.2 °F (36.8 °C)] 98.2 °F (36.8 °C)  Pulse:  [] 98  Resp:  [18-22] 20  SpO2:  [94 %-100 %] 100 %  BP: (133-156)/(49-88) 156/78   GENERAL: In no acute distress, afebrile  HEENT:  CHEST:  Improved air movement, diminished breath sounds and prolonged expiratory phase  HEART: S1, S2, no appreciable murmur  ABDOMEN: Soft, nontender, BS +  MSK: Warm, no lower extremity edema, no clubbing or cyanosis  NEUROLOGIC: Alert and oriented x4, moving all extremities with good strength   INTEGUMENTARY:  PSYCHIATRY:        ASSESSMENT/PLAN   Bilateral lower lobe community-acquired pneumonia  Sepsis   Acute hypoxemic respiratory failure requiring oxygen  Acute on chronic end-stage COPD exacerbation    History of: HTN, HLD, COPD 2 L home O2, panic disorder, prostate cancer receiving injections every 3 months, essential tremors      Follow respiratory culture, GNR so far.  Continue cefepime given significant COPD and lung changes.  Needs Pseudomonas coverage.  Finish azithromycin.  DuoNeb every 4 hours, prednisone 40 daily.  Mucinex.  Wean oxygen as tolerated    DVT prophylaxis:  Lovenox 40    Anticipated discharge and disposition:  Discharge once we have respiratory cultures to determine culture specific antibiotics.  __________________________________________________________________________    LABS/MICRO/MEDS/DIAGNOSTICS       LABS  Recent Labs     10/15/23  0451      K 4.1   CHLORIDE 105   CO2 23   BUN 15.3   CREATININE 0.63*   GLUCOSE 140*   CALCIUM 8.9       Recent Labs     10/15/23  0451   WBC 16.69*   RBC 3.63*   HCT 33.1*   MCV 91.2            MICROBIOLOGY  Microbiology Results (last 7 days)       Procedure Component Value Units Date/Time    Respiratory Culture [[id number]]  (Abnormal) Collected: 10/14/23 1020    Order Status:  "Completed Specimen: Sputum Updated: 10/16/23 0735     Respiratory Culture Many Gram-negative Rods     Comment: with normal respiratory sima        GRAM STAIN Quality 3+      Rare Gram positive cocci      Rare Gram Negative Rods    Blood Culture #1 **CANNOT BE ORDERED STAT** [[id number]]  (Normal) Collected: 10/12/23 1112    Order Status: Completed Specimen: Blood Updated: 10/15/23 1200     CULTURE, BLOOD ([facility]) No Growth At 72 Hours    Blood Culture #2 **CANNOT BE ORDERED STAT** [[id number]]  (Normal) Collected: 10/12/23 1112    Order Status: Completed Specimen: Blood Updated: 10/15/23 1200     CULTURE, BLOOD ([facility]) No Growth At 72 Hours               MEDICATIONS   albuterol-ipratropium  3 mL Nebulization Q4H WAKE    ALPRAZolam  0.5 mg Oral Daily    aspirin  81 mg Oral Daily    azithromycin  500 mg Intravenous Q24H    ceFEPime (MAXIPIME) IVPB  2 g Intravenous Q8H    enoxparin  40 mg Subcutaneous Daily    finasteride  5 mg Oral Daily    FLUoxetine  20 mg Oral Daily    fluticasone furoate-vilanteroL  1 puff Inhalation Daily    guaiFENesin  1,200 mg Oral BID    predniSONE  40 mg Oral Daily    roflumilast  1 tablet Oral Daily    senna-docusate 8.6-50 mg  2 tablet Oral BID    tamsulosin  1 capsule Oral Daily    tiotropium bromide  2 puff Inhalation Daily         INFUSIONS         DIAGNOSTIC TESTS  X-Ray Chest AP Portable   Final Result      Prominent interstitial markings of the bilateral lower lobes increased in the interval, however believed related to worsening chronic lung process.  Superimposed early infectious process is not entirely excluded.         Electronically signed by: Manoj Lentz   Date:    10/12/2023   Time:    11:13           No results found for: "EF"       NUTRITION STATUS  Patient meets ASPEN criteria for   malnutrition of   per RD assessment as evidenced by:                       A minimum of two characteristics is recommended for diagnosis of either severe or non-severe malnutrition.       Case " related differential diagnoses have been reviewed; assessment and plan has been documented. I have personally reviewed the labs and test results that are currently available; I have reviewed the patients medication list. I have reviewed the consulting providers recommendations. I have reviewed or attempted to review medical records based upon their availability.  All of the patient's and/or family's questions have been addressed and answered to the best of my ability.  I will continue to monitor closely and make adjustments to medical management as needed.  This document was created using M*Modal Fluency Direct.  Transcription errors may have been made.  Please contact me if any questions may rise regarding documentation to clarify transcription.        Rey Sutherland MD   Internal Medicine  Department of Hospital Medicine  [facility]

## 2023-10-17 VITALS
HEIGHT: 70 IN | HEART RATE: 90 BPM | DIASTOLIC BLOOD PRESSURE: 55 MMHG | SYSTOLIC BLOOD PRESSURE: 128 MMHG | WEIGHT: 140 LBS | TEMPERATURE: 98 F | OXYGEN SATURATION: 98 % | RESPIRATION RATE: 20 BRPM | BODY MASS INDEX: 20.04 KG/M2

## 2023-10-17 PROBLEM — J44.1 COPD EXACERBATION: Status: ACTIVE | Noted: 2023-03-18

## 2023-10-17 LAB
BACTERIA BLD CULT: NORMAL
BACTERIA BLD CULT: NORMAL
BACTERIA SPEC CULT: ABNORMAL
GRAM STN SPEC: ABNORMAL

## 2023-10-17 PROCEDURE — 25000003 PHARM REV CODE 250: Performed by: HOSPITALIST

## 2023-10-17 PROCEDURE — 94640 AIRWAY INHALATION TREATMENT: CPT

## 2023-10-17 PROCEDURE — 25000003 PHARM REV CODE 250: Performed by: INTERNAL MEDICINE

## 2023-10-17 PROCEDURE — 25000242 PHARM REV CODE 250 ALT 637 W/ HCPCS: Performed by: INTERNAL MEDICINE

## 2023-10-17 PROCEDURE — 27000221 HC OXYGEN, UP TO 24 HOURS

## 2023-10-17 PROCEDURE — 63600175 PHARM REV CODE 636 W HCPCS: Performed by: INTERNAL MEDICINE

## 2023-10-17 PROCEDURE — 99900035 HC TECH TIME PER 15 MIN (STAT)

## 2023-10-17 RX ORDER — GUAIFENESIN 600 MG/1
1200 TABLET, EXTENDED RELEASE ORAL 2 TIMES DAILY
Qty: 28 TABLET | Refills: 0 | Status: SHIPPED | OUTPATIENT
Start: 2023-10-17 | End: 2023-10-24

## 2023-10-17 RX ORDER — PREDNISONE 20 MG/1
TABLET ORAL
Qty: 6 TABLET | Refills: 0 | Status: SHIPPED | OUTPATIENT
Start: 2023-10-18 | End: 2023-10-22

## 2023-10-17 RX ORDER — SULFAMETHOXAZOLE AND TRIMETHOPRIM 800; 160 MG/1; MG/1
1 TABLET ORAL 2 TIMES DAILY
Qty: 14 TABLET | Refills: 0 | Status: SHIPPED | OUTPATIENT
Start: 2023-10-17 | End: 2023-10-24

## 2023-10-17 RX ADMIN — FLUOXETINE 20 MG: 20 CAPSULE ORAL at 09:10

## 2023-10-17 RX ADMIN — PREDNISONE 40 MG: 20 TABLET ORAL at 09:10

## 2023-10-17 RX ADMIN — PIPERACILLIN AND TAZOBACTAM 4.5 G: 4; .5 INJECTION, POWDER, LYOPHILIZED, FOR SOLUTION INTRAVENOUS; PARENTERAL at 09:10

## 2023-10-17 RX ADMIN — TIOTROPIUM BROMIDE INHALATION SPRAY 2 PUFF: 3.12 SPRAY, METERED RESPIRATORY (INHALATION) at 09:10

## 2023-10-17 RX ADMIN — IPRATROPIUM BROMIDE AND ALBUTEROL SULFATE 3 ML: 2.5; .5 SOLUTION RESPIRATORY (INHALATION) at 08:10

## 2023-10-17 RX ADMIN — GUAIFENESIN 1200 MG: 600 TABLET, EXTENDED RELEASE ORAL at 09:10

## 2023-10-17 RX ADMIN — ALPRAZOLAM 0.5 MG: 0.5 TABLET ORAL at 06:10

## 2023-10-17 RX ADMIN — FINASTERIDE 5 MG: 5 TABLET, FILM COATED ORAL at 09:10

## 2023-10-17 RX ADMIN — ASPIRIN 81 MG: 81 TABLET, COATED ORAL at 09:10

## 2023-10-17 RX ADMIN — IPRATROPIUM BROMIDE AND ALBUTEROL SULFATE 3 ML: 2.5; .5 SOLUTION RESPIRATORY (INHALATION) at 12:10

## 2023-10-17 RX ADMIN — FLUTICASONE FUROATE AND VILANTEROL TRIFENATATE 1 PUFF: 200; 25 POWDER RESPIRATORY (INHALATION) at 09:10

## 2023-10-17 RX ADMIN — TAMSULOSIN HYDROCHLORIDE 0.4 MG: 0.4 CAPSULE ORAL at 09:10

## 2023-10-17 NOTE — DISCHARGE SUMMARY
[facility] HOSPITAL MEDICINE - DISCHARGE SUMMARY    Patient Name: Sher Sweeney  MRN: [id number]  Admission Date: 10/12/2023  Discharge Date: 10/17/2023  Hospital Length of Stay: 5 days  Discharge Provider: Rey Sutherland MD  Primary Care Provider: Fernanda Simental MD      HOSPITAL COURSE   Sher Sweeney is a 83 y.o. White male with a past medical history of hypertension, hyperlipidemia, COPD on 2 L O2 at home, panic order, essential tremors and prostate cancer receiving injections every 3 months. The patient presented to [facility] on 10/12/2023 with a primary complaint of shortness of breath.  Patient reports using oxygen at home intermittently at 2 L but has been having to wear it all the time over the last few days.  He reports normally having a cough with mucus production but has not been coughing much lately.  He feels as if his congestion is mostly in his chest.  He states his temperature reached a max of 101° F yesterday.  He denies complaints of chest pain, nausea and vomiting.  He has been using home nebulizer treatments without much relief in symptoms.  His pulmonologist is Dr. Rabago.   Upon presentation to the ED, temperature 98.1° F, heart rate 116, blood pressure 122/58, respiratory rate 30 and SpO2 95% on 2 L nasal cannula. Labs with WBC 29.2, H&H 7.8/36.2, potassium 3.1, , troponin less than 0.01.  ABG with pH 11.43, pCO2 36, PO2 110, pHC03 Influenza A and B and SARS-COV-2 PCR negative.  EKG atrial flutter with variable AV block and premature ventricular/aberrantly conducted complexes and nonspecific ST and T-wave abnormalities.  Chest x-ray with prominent interstitial markings in bilateral lower lobes increased in interval however may be related to worsening chronic lung process, superimposed early infectious process is not entirely excluded.    Essentially admitted for pneumonia with COPD exacerbation.  He was initially treated with broad-spectrum antibiotics  with cefepime and azithromycin.  Respiratory cultures later returned back positive for achromobacter sensitive to Zosyn and Bactrim.  He will be getting his 1st days of Zosyn today and plan for discharge on Bactrim.  He has been improving during the hospital stay.  I will also provide him another 4 days of steroids to do a 10 day course.  He is sounding much better on exam as well.  Patient oxygenation is actually better than his reported baseline.      Of note patient had a allergy listed to penicillin in the chart.  Patient asked about this and he stated that a dentist diagnosed him with this after he was prescribed amoxicillin and he had arm swelling.  Since then he has taken amoxicillin and has had no issues at all.  I have discontinued the penicillin allergy in the chart and has received his 1st dose of Zosyn here in the hospital.        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 for 7 days  Qty: 28 tablet, Refills: 0      sulfamethoxazole-trimethoprim 800-160mg (BACTRIM DS) 800-160 mg Tab Take 1 tablet by mouth 2 (two) times daily. for 7 days  Qty: 14 tablet, Refills: 0           CONTINUE these medications which have CHANGED    Details   predniSONE (DELTASONE) 20 MG tablet Take 2 tablets (40 mg total) by mouth once daily for 2 days, THEN 1 tablet (20 mg total) once daily for 2 days.  Qty: 6 tablet, Refills: 0           CONTINUE these medications which have NOT CHANGED    Details   albuterol-ipratropium (DUO-NEB) 2.5 mg-0.5 mg/3 mL nebulizer solution Take 3 mLs by nebulization every 6 (six) hours as needed for Wheezing. Rescue      ALPRAZolam (XANAX) 0.5 MG tablet TAKE 1 TABLET BY MOUTH EVERY DAY  Qty: 90 tablet, Refills: 1      aspirin (ECOTRIN) 81 MG EC tablet Take 81 mg by mouth once daily.      !! DALIRESP 500 mcg Tab Take 1 tablet (500 mcg total) by mouth once daily. Take 1 tablet by mouth once daily.  Qty: 30 tablet, Refills: 6    Associated Diagnoses: Asthma, unspecified asthma severity, unspecified whether complicated, unspecified whether persistent      finasteride (PROSCAR) 5 mg tablet       FLUoxetine 20 MG capsule TAKE 1 CAPSULE BY MOUTH DAILY  Qty: 90 capsule, Refills: 1      fluticasone-umeclidin-vilanter (TRELEGY ELLIPTA) 100-62.5-25 mcg DsDv INHALE 1 PUFF BY MOUTH DAILY AT THE SAME TIME EVERY_DAY  Qty: 3 each, Refills: 3    Associated Diagnoses: Asthma, unspecified asthma severity, unspecified whether complicated, unspecified whether persistent      !! roflumilast (DALIRESP) 500 mcg Tab Take 1 tablet (500 mcg total) by mouth once daily.  Qty: 30 tablet, Refills: 11    Associated Diagnoses: Asthma, unspecified asthma severity, unspecified whether complicated, unspecified whether persistent      tamsulosin (FLOMAX) 0.4 mg Cap Take 1 capsule by mouth.       !! - Potential  duplicate medications found. Please discuss with provider.        STOP taking these medications       doxycycline (VIBRAMYCIN) 100 MG Cap Comments:   Reason for Stopping:                  CONSULTS         FOLLOW UP      Follow-up Information       Fernanda Simental MD Follow up in 1 week(s).    Specialty: Family Medicine  Contact information:  [address] [place] [id number] [phone]                                 DISCHARGE INSTRUCTIONS     Explained in detail to the patient about the discharge plan, medications, and follow-up visits. Pt understands and agrees with the treatment plan.  Discharged Condition: stable  Diet as tolerated  Activities as tolerated  Discharge to: Home or Self Care    TIME SPENT ON DISCHARGE   35 minutes        Rey Sutherland MD  Internal Medicine  Department of Hospital Medicine  [facility]      This document was created using electronic dictation services.  Please excuse any errors that may have been made.  Contact me if any questions regarding documentation to clarify verbiage.

## 2023-10-17 NOTE — NURSING
Patient stable upon discharge. Discharge education and information given prior to discharge. Patient verbalized understanding. Patient left via transport to ED.

## 2023-10-19 ENCOUNTER — PATIENT OUTREACH (OUTPATIENT)
Dept: ADMINISTRATIVE | Facility: CLINIC | Age: 83
End: 2023-10-19
Payer: MEDICARE

## 2023-10-19 NOTE — PROGRESS NOTES
C3 nurse attempted to contact Sher Melvinurm  for a TCC post hospital discharge follow up call. No answer. Left voicemail with callback information. The patient has a scheduled HOSFU appointment with Fernanda Simental MD (Family Medicine) on October 26, 2023 @ 4:00 pm.

## 2023-10-26 RX ORDER — IPRATROPIUM BROMIDE AND ALBUTEROL SULFATE 2.5; .5 MG/3ML; MG/3ML
3 SOLUTION RESPIRATORY (INHALATION) EVERY 6 HOURS PRN
Qty: 75 ML | Refills: 4 | Status: SHIPPED | OUTPATIENT
Start: 2023-10-26 | End: 2023-11-02 | Stop reason: SDUPTHER

## 2023-10-26 NOTE — TELEPHONE ENCOUNTER
----- Message from Shavonne Cainey sent at 10/26/2023  3:17 PM CDT -----  Regarding: Med refill  Pt is requesting a refill on Ventolin to be sent to [facility] [address]. Pt r/s appt from today for next week 10/30/2023 due to him not feeling well.   Thank you

## 2023-11-02 ENCOUNTER — OFFICE VISIT (OUTPATIENT)
Dept: FAMILY MEDICINE | Facility: CLINIC | Age: 83
End: 2023-11-02
Payer: MEDICARE

## 2023-11-02 VITALS
TEMPERATURE: 98 F | DIASTOLIC BLOOD PRESSURE: 58 MMHG | SYSTOLIC BLOOD PRESSURE: 97 MMHG | OXYGEN SATURATION: 95 % | HEART RATE: 89 BPM | HEIGHT: 69 IN | WEIGHT: 129.63 LBS | BODY MASS INDEX: 19.2 KG/M2

## 2023-11-02 DIAGNOSIS — J44.9 COPD WITH HYPOXIA: ICD-10-CM

## 2023-11-02 DIAGNOSIS — R73.9 BLOOD GLUCOSE ELEVATED: ICD-10-CM

## 2023-11-02 DIAGNOSIS — I48.11 LONGSTANDING PERSISTENT ATRIAL FIBRILLATION: ICD-10-CM

## 2023-11-02 DIAGNOSIS — F32.A ANXIETY AND DEPRESSION: ICD-10-CM

## 2023-11-02 DIAGNOSIS — F41.9 ANXIETY AND DEPRESSION: ICD-10-CM

## 2023-11-02 DIAGNOSIS — E78.2 MIXED HYPERLIPIDEMIA: ICD-10-CM

## 2023-11-02 DIAGNOSIS — Z09 HOSPITAL DISCHARGE FOLLOW-UP: Primary | ICD-10-CM

## 2023-11-02 PROCEDURE — 99214 OFFICE O/P EST MOD 30 MIN: CPT | Mod: ,,, | Performed by: FAMILY MEDICINE

## 2023-11-02 PROCEDURE — 1160F RVW MEDS BY RX/DR IN RCRD: CPT | Mod: CPTII,,, | Performed by: FAMILY MEDICINE

## 2023-11-02 PROCEDURE — 1159F MED LIST DOCD IN RCRD: CPT | Mod: CPTII,,, | Performed by: FAMILY MEDICINE

## 2023-11-02 PROCEDURE — 1160F PR REVIEW ALL MEDS BY PRESCRIBER/CLIN PHARMACIST DOCUMENTED: ICD-10-PCS | Mod: CPTII,,, | Performed by: FAMILY MEDICINE

## 2023-11-02 PROCEDURE — 99214 PR OFFICE/OUTPT VISIT, EST, LEVL IV, 30-39 MIN: ICD-10-PCS | Mod: ,,, | Performed by: FAMILY MEDICINE

## 2023-11-02 PROCEDURE — 3078F PR MOST RECENT DIASTOLIC BLOOD PRESSURE < 80 MM HG: ICD-10-PCS | Mod: CPTII,,, | Performed by: FAMILY MEDICINE

## 2023-11-02 PROCEDURE — 1159F PR MEDICATION LIST DOCUMENTED IN MEDICAL RECORD: ICD-10-PCS | Mod: CPTII,,, | Performed by: FAMILY MEDICINE

## 2023-11-02 PROCEDURE — 3074F SYST BP LT 130 MM HG: CPT | Mod: CPTII,,, | Performed by: FAMILY MEDICINE

## 2023-11-02 PROCEDURE — 3074F PR MOST RECENT SYSTOLIC BLOOD PRESSURE < 130 MM HG: ICD-10-PCS | Mod: CPTII,,, | Performed by: FAMILY MEDICINE

## 2023-11-02 PROCEDURE — 3078F DIAST BP <80 MM HG: CPT | Mod: CPTII,,, | Performed by: FAMILY MEDICINE

## 2023-11-02 RX ORDER — PREDNISONE 10 MG/1
TABLET ORAL
Qty: 47 TABLET | Refills: 0 | Status: SHIPPED | OUTPATIENT
Start: 2023-11-02 | End: 2023-12-20

## 2023-11-02 RX ORDER — ALBUTEROL SULFATE 0.83 MG/ML
2.5 SOLUTION RESPIRATORY (INHALATION) EVERY 6 HOURS PRN
COMMUNITY

## 2023-11-02 RX ORDER — ALBUTEROL SULFATE 90 UG/1
2 AEROSOL, METERED RESPIRATORY (INHALATION) EVERY 6 HOURS PRN
Qty: 18 G | Refills: 3 | Status: SHIPPED | OUTPATIENT
Start: 2023-11-02 | End: 2024-11-01

## 2023-11-02 RX ORDER — IPRATROPIUM BROMIDE AND ALBUTEROL SULFATE 2.5; .5 MG/3ML; MG/3ML
3 SOLUTION RESPIRATORY (INHALATION) EVERY 6 HOURS PRN
Qty: 75 ML | Refills: 4 | Status: SHIPPED | OUTPATIENT
Start: 2023-11-02 | End: 2024-02-27

## 2023-11-02 RX ORDER — ALBUTEROL SULFATE 90 UG/1
2 AEROSOL, METERED RESPIRATORY (INHALATION) EVERY 6 HOURS PRN
COMMUNITY
End: 2023-11-02 | Stop reason: SDUPTHER

## 2023-11-02 RX ORDER — ALPRAZOLAM 0.5 MG/1
0.5 TABLET ORAL DAILY
Qty: 90 TABLET | Refills: 1 | Status: SHIPPED | OUTPATIENT
Start: 2023-11-02 | End: 2024-11-01

## 2023-11-02 NOTE — PROGRESS NOTES
Patient ID: [id number]     Chief Complaint: Pneumonia (Hospital follow-up)      HPI:     Sher Sweeney is a 83 y.o. male here today with his wife for follow-up visit.  Patient was recently hospitalized with community-acquired pneumonia.  After spending 1 week in the hospital noticeable weakness.  Patient is interested in home health with physical therapy and occupational therapy.  Long-term complications discussed.  Goals of care discussed.   1) COPD: Patient is now oxygen dependent-currently using Xanax half a tablet twice a day-does help with breathing.  Need prescriptions to be refilled.    Past Medical History:   Diagnosis Date    Anxiety and depression 2023    Elevated hemoglobin A1c 2023    Essential (primary) hypertension 2023    History of prostate cancer 2023    Mixed hyperlipidemia 2023    Panic disorder 2023        Past Surgical History:   Procedure Laterality Date    CATARACT EXTRACTION Bilateral     COLONOSCOPY      HERNIA REPAIR          Social History     Tobacco Use    Smoking status: Former     Current packs/day: 0.00     Types: Cigarettes     Quit date: 2012     Years since quittin.3    Smokeless tobacco: Never   Substance Use Topics    Alcohol use: Yes    Drug use: Never        Social History     Socioeconomic History    Marital status:      Spouse name: [facility]    Number of children: 3        Current Outpatient Medications   Medication Instructions    albuterol (PROVENTIL) 2.5 mg, Nebulization, Every 6 hours PRN, Rescue    albuterol (VENTOLIN HFA) 90 mcg/actuation inhaler 2 puffs, Inhalation, Every 6 hours PRN, Rescue    albuterol-ipratropium (DUO-NEB) 2.5 mg-0.5 mg/3 mL nebulizer solution 3 mLs, Nebulization, Every 6 hours PRN, Rescue    ALPRAZolam (XANAX) 0.5 mg, Oral, Daily    aspirin (ECOTRIN) 81 mg, Oral, Daily    DALIRESP 500 mcg, Oral, Daily, Take 1 tablet by mouth once daily.    finasteride (PROSCAR) 5 mg tablet No dose, route, or  "frequency recorded.    FLUoxetine 20 MG capsule TAKE 1 CAPSULE BY MOUTH DAILY    fluticasone-umeclidin-vilanter (TRELEGY ELLIPTA) 100-62.5-25 mcg DsDv INHALE 1 PUFF BY MOUTH DAILY AT THE SAME TIME EVERY_DAY    predniSONE (DELTASONE) 10 MG tablet Take 5 tablets (50 mg total) by mouth once daily for 3 days, THEN 4 tablets (40 mg total) once daily for 3 days, THEN 3 tablets (30 mg total) once daily for 3 days, THEN 2 tablets (20 mg total) once daily for 3 days, THEN 1 tablet (10 mg total) once daily for 5 days.    roflumilast (DALIRESP) 500 mcg, Oral, Daily    tamsulosin (FLOMAX) 0.4 mg Cap 1 capsule, Oral       Review of patient's allergies indicates:  No Known Allergies     Patient Care Team:  Fernanda Simental MD as PCP - General (Family Medicine)       Subjective:     Review of Systems    12 point review of systems conducted, negative except as stated in the history of present illness. See HPI for details.      Objective:     Visit Vitals  BP (!) 97/58   Pulse 89   Temp 97.5 °F (36.4 °C)   Ht 5' 9" (1.753 m)   Wt 58.8 kg (129 lb 9.6 oz)   SpO2 95%   BMI 19.14 kg/m²       Physical Exam    General: Alert and oriented, extremely thin  Head: Normocephalic, Atraumatic.  Eye: Pupils are equal, round and reactive to light, Extraocular movements are intact, Sclera non-icteric.  Ears/Nose/Throat: Normal, Mucosa moist,Clear.  Neck/Thyroid: Supple, Full range of motion.  Respiratory:  Patient is on oxygen.  Air exchange noted.  Cardiovascular: S1,S2  Gastrointestinal: Soft, Non-tender, Non-distended, Normal bowel sounds  Musculoskeletal: Normal range of motion.  Integumentary: Warm, Dry  Extremities:  Changes consistent with osteoarthritis  Neurologic: No focal deficits, Cranial Nerves II-XII are grossly intact.  Psychiatric: Normal interaction, Coherent speech, Euthymic mood, Appropriate affect       Assessment:       ICD-10-CM ICD-9-CM   1. Hospital discharge follow-up  Z09 V67.59   2. COPD with hypoxia  J44.9 496     799.02 "   3. Longstanding persistent atrial fibrillation  I48.11 427.31   4. Anxiety and depression  F41.9 300.00    F32.A 311   5. Mixed hyperlipidemia  E78.2 272.2   6. Blood glucose elevated  R73.9 790.29        Plan:     1. Hospital discharge follow-up  Patient was hospitalized for community-acquired pneumonia -since discharge patient has been doing well-continuing to [facility]arcia with weakness-home health options discussed.  Discharge instructions reviewed-medications reviewed-changes updated to patient's chart.   - Ambulatory referral/consult to Home Health; Future    2. COPD with hypoxia  Patient is currently stable.  No acute modifications recommended.  Continue with current treatment.  Per patient's request prescriptions refilled.  Per family's request referral to Palliative care.    - albuterol-ipratropium (DUO-NEB) 2.5 mg-0.5 mg/3 mL nebulizer solution; Take 3 mLs by nebulization every 6 (six) hours as needed for Wheezing. Rescue  Dispense: 75 mL; Refill: 4  - ALPRAZolam (XANAX) 0.5 MG tablet; Take 1 tablet (0.5 mg total) by mouth once daily.  Dispense: 90 tablet; Refill: 1  - albuterol (VENTOLIN HFA) 90 mcg/actuation inhaler; Inhale 2 puffs into the lungs every 6 (six) hours as needed for Wheezing. Rescue  Dispense: 18 g; Refill: 3  - predniSONE (DELTASONE) 10 MG tablet; Take 5 tablets (50 mg total) by mouth once daily for 3 days, THEN 4 tablets (40 mg total) once daily for 3 days, THEN 3 tablets (30 mg total) once daily for 3 days, THEN 2 tablets (20 mg total) once daily for 3 days, THEN 1 tablet (10 mg total) once daily for 5 days.  Dispense: 47 tablet; Refill: 0  - CBC Auto Differential; Future  - Comprehensive Metabolic Panel; Future    3. Longstanding persistent atrial fibrillation  Patient is currently stable.  No acute modifications recommended.  Continue with current treatment.     4. Anxiety and depression  Depression/Anxiety: Patient is doing well on medication for depression/anxiety.  No acute changes  needed.    5. Mixed hyperlipidemia  Check studies management based upon results.  Pathophysiology/treatment/dangers discussed.   - Lipid Panel; Future  - TSH; Future    6. Blood glucose elevated  Check studies management based upon results.  Pathophysiology/treatment/dangers discussed.   - Hemoglobin A1C; Future  - Urinalysis; Future  - Urinalysis    Follow up if symptoms worsen or fail to improve, for Medicare Wellness. In addition to their scheduled follow up, the patient has also been instructed to follow up on as needed basis.     Future Appointments   Date Time Provider Department Center   11/20/2023  1:20 PM [facility]   12/21/2023 11:45 AM Fernanda Simental MD [facility] HOWIE Simental MD

## 2023-11-13 ENCOUNTER — LAB REQUISITION (OUTPATIENT)
Dept: LAB | Facility: HOSPITAL | Age: 83
End: 2023-11-13
Payer: MEDICARE

## 2023-11-13 DIAGNOSIS — J44.9 CHRONIC OBSTRUCTIVE PULMONARY DISEASE, UNSPECIFIED: ICD-10-CM

## 2023-11-13 LAB
ALBUMIN SERPL-MCNC: 2.8 G/DL (ref 3.4–4.8)
ALBUMIN/GLOB SERPL: 0.7 RATIO (ref 1.1–2)
ALP SERPL-CCNC: 66 UNIT/L (ref 40–150)
ALT SERPL-CCNC: 17 UNIT/L (ref 0–55)
APPEARANCE UR: CLEAR
AST SERPL-CCNC: 14 UNIT/L (ref 5–34)
BASOPHILS # BLD AUTO: 0.03 X10(3)/MCL
BASOPHILS NFR BLD AUTO: 0.3 %
BILIRUB SERPL-MCNC: 0.3 MG/DL
BILIRUB UR QL STRIP.AUTO: NEGATIVE
BUN SERPL-MCNC: 19.9 MG/DL (ref 8.4–25.7)
CALCIUM SERPL-MCNC: 9.5 MG/DL (ref 8.8–10)
CHLORIDE SERPL-SCNC: 104 MMOL/L (ref 98–107)
CHOLEST SERPL-MCNC: 169 MG/DL
CHOLEST SERPL-MSCNC: 169 MG/DL (ref 0–200)
CHOLEST/HDLC SERPL: 4 {RATIO} (ref 0–5)
CO2 SERPL-SCNC: 30 MMOL/L (ref 23–31)
COLOR UR AUTO: NORMAL
CREAT SERPL-MCNC: 0.73 MG/DL (ref 0.73–1.18)
EOSINOPHIL # BLD AUTO: 0.69 X10(3)/MCL (ref 0–0.9)
EOSINOPHIL NFR BLD AUTO: 6.1 %
ERYTHROCYTE [DISTWIDTH] IN BLOOD BY AUTOMATED COUNT: 13.9 % (ref 11.5–17)
EST. AVERAGE GLUCOSE BLD GHB EST-MCNC: 122.6 MG/DL
GFR SERPLBLD CREATININE-BSD FMLA CKD-EPI: >60 MLS/MIN/1.73/M2
GLOBULIN SER-MCNC: 4.3 GM/DL (ref 2.4–3.5)
GLUCOSE SERPL-MCNC: 97 MG/DL (ref 82–115)
GLUCOSE UR QL STRIP.AUTO: NEGATIVE
HBA1C MFR BLD: 5.9 %
HBA1C MFR BLD: 5.9 %
HCT VFR BLD AUTO: 37.1 % (ref 42–52)
HDLC SERPL-MCNC: 43 MG/DL (ref 35–60)
HDLC SERPL-MCNC: 43 MG/DL (ref 35–70)
HGB BLD-MCNC: 11.6 G/DL (ref 14–18)
IMM GRANULOCYTES # BLD AUTO: 0.06 X10(3)/MCL (ref 0–0.04)
IMM GRANULOCYTES NFR BLD AUTO: 0.5 %
KETONES UR QL STRIP.AUTO: NEGATIVE
LDLC SERPL CALC-MCNC: 105 MG/DL (ref 0–160)
LDLC SERPL CALC-MCNC: 105 MG/DL (ref 50–140)
LEUKOCYTE ESTERASE UR QL STRIP.AUTO: NEGATIVE
LYMPHOCYTES # BLD AUTO: 2.3 X10(3)/MCL (ref 0.6–4.6)
LYMPHOCYTES NFR BLD AUTO: 20.3 %
MCH RBC QN AUTO: 28.5 PG (ref 27–31)
MCHC RBC AUTO-ENTMCNC: 31.3 G/DL (ref 33–36)
MCV RBC AUTO: 91.2 FL (ref 80–94)
MONOCYTES # BLD AUTO: 0.75 X10(3)/MCL (ref 0.1–1.3)
MONOCYTES NFR BLD AUTO: 6.6 %
NEUTROPHILS # BLD AUTO: 7.51 X10(3)/MCL (ref 2.1–9.2)
NEUTROPHILS NFR BLD AUTO: 66.2 %
NITRITE UR QL STRIP.AUTO: NEGATIVE
NRBC BLD AUTO-RTO: 0 %
PH UR STRIP.AUTO: 5.5 [PH]
PLATELET # BLD AUTO: 302 X10(3)/MCL (ref 130–400)
PMV BLD AUTO: 11.9 FL (ref 7.4–10.4)
POTASSIUM SERPL-SCNC: 3.7 MMOL/L (ref 3.5–5.1)
PROT SERPL-MCNC: 7.1 GM/DL (ref 5.8–7.6)
PROT UR QL STRIP.AUTO: NEGATIVE
RBC # BLD AUTO: 4.07 X10(6)/MCL (ref 4.7–6.1)
RBC UR QL AUTO: NEGATIVE
SODIUM SERPL-SCNC: 147 MMOL/L (ref 136–145)
SP GR UR STRIP.AUTO: 1.01 (ref 1–1.03)
TRIGL SERPL-MCNC: 107 MG/DL (ref 34–140)
TRIGL SERPL-MCNC: 107 MG/DL (ref 40–160)
TSH SERPL-ACNC: 1.93 UIU/ML (ref 0.35–4.94)
UROBILINOGEN UR STRIP-ACNC: 0.2
VLDLC SERPL CALC-MCNC: 21 MG/DL
WBC # SPEC AUTO: 11.34 X10(3)/MCL (ref 4.5–11.5)

## 2023-11-13 PROCEDURE — 85025 COMPLETE CBC W/AUTO DIFF WBC: CPT

## 2023-11-13 PROCEDURE — 83036 HEMOGLOBIN GLYCOSYLATED A1C: CPT

## 2023-11-13 PROCEDURE — 80061 LIPID PANEL: CPT

## 2023-11-13 PROCEDURE — 84443 ASSAY THYROID STIM HORMONE: CPT

## 2023-11-13 PROCEDURE — 81003 URINALYSIS AUTO W/O SCOPE: CPT

## 2023-11-13 PROCEDURE — 80053 COMPREHEN METABOLIC PANEL: CPT

## 2023-11-13 PROCEDURE — 87086 URINE CULTURE/COLONY COUNT: CPT

## 2023-11-14 DIAGNOSIS — F32.A ANXIETY AND DEPRESSION: Primary | ICD-10-CM

## 2023-11-14 DIAGNOSIS — F41.9 ANXIETY AND DEPRESSION: Primary | ICD-10-CM

## 2023-11-14 RX ORDER — FLUOXETINE HYDROCHLORIDE 20 MG/1
CAPSULE ORAL
Qty: 90 CAPSULE | Refills: 1 | Status: SHIPPED | OUTPATIENT
Start: 2023-11-14

## 2023-11-15 LAB — BACTERIA UR CULT: NO GROWTH

## 2023-11-22 ENCOUNTER — DOCUMENTATION ONLY (OUTPATIENT)
Dept: FAMILY MEDICINE | Facility: CLINIC | Age: 83
End: 2023-11-22
Payer: MEDICARE

## 2023-12-20 DIAGNOSIS — J44.9 COPD WITH HYPOXIA: ICD-10-CM

## 2023-12-20 RX ORDER — PREDNISONE 10 MG/1
TABLET ORAL
Qty: 47 TABLET | Refills: 0 | Status: SHIPPED | OUTPATIENT
Start: 2023-12-20 | End: 2024-03-11

## 2023-12-21 ENCOUNTER — OFFICE VISIT (OUTPATIENT)
Dept: FAMILY MEDICINE | Facility: CLINIC | Age: 83
End: 2023-12-21
Payer: MEDICARE

## 2023-12-21 VITALS
OXYGEN SATURATION: 95 % | HEIGHT: 69 IN | HEART RATE: 73 BPM | TEMPERATURE: 98 F | WEIGHT: 132.63 LBS | SYSTOLIC BLOOD PRESSURE: 131 MMHG | DIASTOLIC BLOOD PRESSURE: 70 MMHG | BODY MASS INDEX: 19.64 KG/M2

## 2023-12-21 DIAGNOSIS — Z85.46 HISTORY OF PROSTATE CANCER: ICD-10-CM

## 2023-12-21 DIAGNOSIS — F41.9 ANXIETY AND DEPRESSION: ICD-10-CM

## 2023-12-21 DIAGNOSIS — Z00.00 WELLNESS EXAMINATION: Primary | ICD-10-CM

## 2023-12-21 DIAGNOSIS — I48.19 PERSISTENT ATRIAL FIBRILLATION: ICD-10-CM

## 2023-12-21 DIAGNOSIS — F32.A ANXIETY AND DEPRESSION: ICD-10-CM

## 2023-12-21 DIAGNOSIS — R73.09 ELEVATED HEMOGLOBIN A1C: ICD-10-CM

## 2023-12-21 DIAGNOSIS — F41.0 PANIC DISORDER: ICD-10-CM

## 2023-12-21 PROCEDURE — 1160F RVW MEDS BY RX/DR IN RCRD: CPT | Mod: CPTII,,, | Performed by: FAMILY MEDICINE

## 2023-12-21 PROCEDURE — G0439 PR MEDICARE ANNUAL WELLNESS SUBSEQUENT VISIT: ICD-10-PCS | Mod: ,,, | Performed by: FAMILY MEDICINE

## 2023-12-21 PROCEDURE — 3075F PR MOST RECENT SYSTOLIC BLOOD PRESS GE 130-139MM HG: ICD-10-PCS | Mod: CPTII,,, | Performed by: FAMILY MEDICINE

## 2023-12-21 PROCEDURE — 3075F SYST BP GE 130 - 139MM HG: CPT | Mod: CPTII,,, | Performed by: FAMILY MEDICINE

## 2023-12-21 PROCEDURE — 1160F PR REVIEW ALL MEDS BY PRESCRIBER/CLIN PHARMACIST DOCUMENTED: ICD-10-PCS | Mod: CPTII,,, | Performed by: FAMILY MEDICINE

## 2023-12-21 PROCEDURE — 3078F PR MOST RECENT DIASTOLIC BLOOD PRESSURE < 80 MM HG: ICD-10-PCS | Mod: CPTII,,, | Performed by: FAMILY MEDICINE

## 2023-12-21 PROCEDURE — 1159F MED LIST DOCD IN RCRD: CPT | Mod: CPTII,,, | Performed by: FAMILY MEDICINE

## 2023-12-21 PROCEDURE — G0439 PPPS, SUBSEQ VISIT: HCPCS | Mod: ,,, | Performed by: FAMILY MEDICINE

## 2023-12-21 PROCEDURE — 3078F DIAST BP <80 MM HG: CPT | Mod: CPTII,,, | Performed by: FAMILY MEDICINE

## 2023-12-21 PROCEDURE — 1159F PR MEDICATION LIST DOCUMENTED IN MEDICAL RECORD: ICD-10-PCS | Mod: CPTII,,, | Performed by: FAMILY MEDICINE

## 2023-12-24 NOTE — PROGRESS NOTES
Patient ID: [id number]     Chief Complaint: Medicare AWV        HPI:     Sher Sweeney is a 83 y.o. male here today with his son for a Medicare Wellness. 1) COPD: Patient is doing well with use of inhalers and oxygen. No noticeable side effects. No recent changes in breathing status.  2)  BPH: Patient has been taking medication no problems with urination. No side effects of medication noted-is monitored by urologist.  3) Tremors: Is continuing to have problems with tremors-feels that they are a little bit more stable.    Prostate Cancer Screening -Patient has history of prostate cancer is monitored by urologist.  Colon Cancer Screening - Colonoscopy  Up to date   Eye Exam - Recommend annually.  Dental Exam - Recommend biannually  Vaccinations - Immunization guidelines reviewed with the patient.  Encourage patient to prevent disease through immunizations.    Immunization History   Administered Date(s) Administered    COVID-19, MRNA, LN-S, PF (MODERNA FULL 0.5 ML DOSE) 02/02/2021, 03/09/2021    COVID-19, MRNA, LN-S, PF (Pfizer) (Purple Cap) 11/17/2021    COVID-19, mRNA, LNP-S, bivalent booster, PF (PFIZER OMICRON) 09/13/2022    Influenza (FLUAD) - Quadrivalent - Adjuvanted - PF *Preferred* (65+) 10/28/2021, 09/13/2022    Influenza - High Dose - PF (65 years and older) 10/31/2013, 11/22/2017, 10/24/2018, 10/26/2019    Influenza - Quadrivalent - High Dose - PF (65 years and older) 10/05/2020    Influenza - Quadrivalent - PF *Preferred* (6 months and older) 10/07/2011    Pneumococcal Conjugate - 13 Valent 05/31/2017    Pneumococcal Polysaccharide - 23 Valent 10/31/2013    Tdap 05/31/2017    Zoster 05/18/2015        A separate E/M code has been provided to evaluate additional complaints that the patient would like addressed during the dedicated Medicare Wellness Exam.    Past Medical History:   Diagnosis Date    Anxiety and depression 03/18/2023    Elevated hemoglobin A1c 03/18/2023    Essential (primary) hypertension  2023    History of prostate cancer 2023    Mixed hyperlipidemia 2023    Panic disorder 2023        Past Surgical History:   Procedure Laterality Date    CATARACT EXTRACTION Bilateral     COLONOSCOPY      HERNIA REPAIR          Social History     Tobacco Use    Smoking status: Former     Current packs/day: 0.00     Types: Cigarettes     Quit date: 2012     Years since quittin.4    Smokeless tobacco: Never   Substance Use Topics    Alcohol use: Yes    Drug use: Never        Social History     Socioeconomic History    Marital status:      Spouse name: [facility]    Number of children: 3        Current Outpatient Medications   Medication Instructions    albuterol (PROVENTIL) 2.5 mg, Nebulization, Every 6 hours PRN, Rescue    albuterol (VENTOLIN HFA) 90 mcg/actuation inhaler 2 puffs, Inhalation, Every 6 hours PRN, Rescue    albuterol-ipratropium (DUO-NEB) 2.5 mg-0.5 mg/3 mL nebulizer solution 3 mLs, Nebulization, Every 6 hours PRN, Rescue    ALPRAZolam (XANAX) 0.5 mg, Oral, Daily    aspirin (ECOTRIN) 81 mg, Oral, Daily    DALIRESP 500 mcg, Oral, Daily, Take 1 tablet by mouth once daily.    finasteride (PROSCAR) 5 mg tablet No dose, route, or frequency recorded.    FLUoxetine 20 MG capsule TAKE 1 CAPSULE BY MOUTH DAILY    fluticasone-umeclidin-vilanter (TRELEGY ELLIPTA) 100-62.5-25 mcg DsDv INHALE 1 PUFF BY MOUTH DAILY AT THE SAME TIME EVERY DAY.    predniSONE (DELTASONE) 10 MG tablet TAKE 5 TABLETS BY MOUTH ONCE DAILY FOR 3 DAYS, 4 TABLETS DAILY X 3 DAYS, 3 TABLETS DAILY X 3 DAYS, 2 TABLETS DAILY X 3 DAYS, 1 TABLET DAILY X 5 DAYS    tamsulosin (FLOMAX) 0.4 mg Cap 1 capsule, Oral       Review of patient's allergies indicates:  No Known Allergies     Patient Care Team:  Fernanda Simental MD as PCP - General (Family Medicine)     Subjective:     Review of Systems    12 point review of systems conducted, negative except as stated in the history of present illness. See HPI for  "details.    Patient Reported Health Risk Assessments:       Objective:     Visit Vitals  /70   Pulse 73   Temp 97.5 °F (36.4 °C)   Ht 5' 9" (1.753 m)   Wt 60.1 kg (132 lb 9.6 oz)   SpO2 95%   BMI 19.58 kg/m²       Physical Exam    General: Alert and oriented, No acute distress-patient with essential tremors.  Head: Normocephalic, Atraumatic.  Eye: Pupils are equal, round and reactive to light, Extraocular movements are intact, Sclera non-icteric.  Ears/Nose/Throat: Normal, Mucosa moist,Clear.  Neck/Thyroid: Supple, Non-tender, No palpable thyromegaly or thyroid nodule, No lymphadenopathy, No JVD, Full range of motion.  Respiratory: Clear to auscultation bilaterally, Non-labored respirations, Symmetrical chest wall expansion.  Cardiovascular: Regular rate and rhythm  Gastrointestinal: Soft, Non-tender, Non-distended, Normal bowel sounds, No palpable organomegaly.  Musculoskeletal: Normal range of motion.  Integumentary: Warm, Dry, Intact  Extremities: No clubbing, cyanosis or edema  Neurologic: No focal deficits, Cranial Nerves II-XII are grossly intact  Psychiatric: Normal interaction, Coherent speech, Euthymic mood, Appropriate affect       Assessment:       ICD-10-CM ICD-9-CM   1. Wellness examination  Z00.00 V70.0   2. Anxiety and depression  F41.9 300.00    F32.A 311   3. Panic disorder  F41.0 300.01   4. Persistent atrial fibrillation  I48.19 427.31   5. History of prostate cancer  Z85.46 V10.46   6. Elevated hemoglobin A1c  R73.09 790.29        Plan:     1. Wellness examination  Patient presented to office today for their Medicare Annual Wellness Visit.    Education was provided on health nutrition, including a diet tino in fruits and vegetables, minimizing simple carbohydrates, salt, and saturated fats.  Encouraged regular cardiovascular exercise such as walking at lease 30 minutes daily, 5 times per week.  Emphasized preventive health measures and educated pt on fall prevention and community-based " lifestule interventions to help reduce health risks and promote healthy living.     2. Anxiety and depression  Depression/Anxiety: Patient is doing well on medication for depression/anxiety- continue to encourage lifestyle modifications including 20-30 minutes exercise daily/ meditation/ empower self through compassion.     3. Panic disorder  Patient is currently using benzodiazepine in order to address issues related to acute anxiety/complications of COPD-risk of abuse and addiction discussed with patient.  Encouraged patient to limit use at lowest dose possible/least frequency.  Continue to encourage lifestyle modifications including regular exercise.     4. Persistent atrial fibrillation  Patient is currently stable.  No acute modifications recommended.  Continue with current treatment.    - CBC Auto Differential; Future  - Comprehensive Metabolic Panel; Future    5. History of prostate cancer  Patient is comanage with urologist-no acute modifications or recommendations at this time.    6. Elevated hemoglobin A1c  Hemoglobin A1c 5.9   Normal less than 5.7 - Diagnosis of Type 2 Diabetes Mellitus at 6.5. Encourage diet and activity modification- Pathophysiology/treatment/dangers discussed.          Fall Risk + Home Safety + Living Situation + Whisper Test + Depression Screen + CAGE + Cognitive Impairment Screen + ADL Screen + Timed Get Up and Go + Nutrition Screen + PAQ Screen + Health Risk Assessment all reviewed.          No data to display                  11/17/2023     9:00 AM 5/16/2023     1:40 PM 11/15/2022     1:40 PM   Fall Risk Assessment - Outpatient   Mobility Status Ambulatory Ambulatory Ambulatory   Number of falls 0 0 0   Identified as fall risk False False False                             CVD Risk Factors - Reviewed  Obesity/Physical Activity - Normal BMI. Encouraged daily 30 minute physical activity x 5 days per week.    Opioid Screening: Patient medication list reviewed, patient is not taking  prescription opioids. Patient is not using additional opioids than prescribed. Patient is at low risk of substance abuse based on this opioid use history.     Advance Directives:   Living Will: No        Oral Declaration: No    [place]: No    Do Not Resuscitate Status: No    Medical Power of : No        Oral Declaration: No      Decision Making:  Patient answered questions  Goals of Care: The patient endorses that what is most important right now is to focus on quality of life, even if it means sacrificing a little time    Accordingly, we have decided that the best plan to meet the patient's goals includes continuing with treatment    Advance Care Planning   Advanced Care Planning: I offered to discuss Advanced Care Planning, which consists of how you would like to be cared for as you end the near of your natural life.  This includes how to pick a person who would make decisions for you if you were unable to make them for yourself, which is called a Medical Power of . These discussions include what kind of decisions you will make regarding life sustaining measures, such as ventilators and feeding tubes, when faced with a life limiting illness recorded on a living will that they will need to know.            The patient's Health Maintenance was reviewed and the following appears to be due at this time:   Health Maintenance Due   Topic Date Due    RSV Vaccine (Age 60+ and Pregnant patients) (1 - 1-dose 60+ series) Never done    Shingles Vaccine (2 of 3) 07/13/2015    Influenza Vaccine (1) 09/01/2023    COVID-19 Vaccine (5 - 2023-24 season) 09/01/2023         Follow up in about 6 months (around 6/21/2024). In addition to their scheduled follow up, the patient has also been instructed to follow up on as needed basis.     Future Appointments   Date Time Provider Department [place]   5/21/2024  9:00 AM NURSE PRACTITIONER, [facility] [place]   6/24/2024 10:15 AM Fernanda Simental MD [facility]  [place]   1/6/2025 11:15 AM Fernanda Simental MD [facility] [facility]        Provided patient with a 5-10 year written screening schedule and personal prevention plan. Recommendations were developed using the USPSTF age appropriate recommendations. Education, counseling, and referrals were provided as needed. After Visit Summary printed and given to patient which includes a list of additional screenings\tests needed.    Fernanda Simental MD

## 2024-02-27 DIAGNOSIS — J44.9 COPD WITH HYPOXIA: ICD-10-CM

## 2024-02-27 RX ORDER — IPRATROPIUM BROMIDE AND ALBUTEROL SULFATE 2.5; .5 MG/3ML; MG/3ML
SOLUTION RESPIRATORY (INHALATION)
Qty: 90 ML | Refills: 3 | Status: SHIPPED | OUTPATIENT
Start: 2024-02-27

## 2024-03-11 DIAGNOSIS — J44.9 COPD WITH HYPOXIA: ICD-10-CM

## 2024-03-11 RX ORDER — PREDNISONE 10 MG/1
TABLET ORAL
Qty: 47 TABLET | Refills: 0 | Status: SHIPPED | OUTPATIENT
Start: 2024-03-11 | End: 2024-04-19

## 2024-04-17 RX ORDER — FLUOXETINE 10 MG/1
CAPSULE ORAL
Qty: 90 CAPSULE | Refills: 3 | OUTPATIENT
Start: 2024-04-17

## 2024-04-19 DIAGNOSIS — J44.9 COPD WITH HYPOXIA: ICD-10-CM

## 2024-04-19 RX ORDER — PREDNISONE 10 MG/1
TABLET ORAL
Qty: 47 TABLET | Refills: 0 | Status: SHIPPED | OUTPATIENT
Start: 2024-04-19 | End: 2024-06-03

## 2024-04-27 DIAGNOSIS — J44.9 COPD WITH HYPOXIA: ICD-10-CM

## 2024-04-29 RX ORDER — ALBUTEROL SULFATE 90 UG/1
2 AEROSOL, METERED RESPIRATORY (INHALATION) EVERY 6 HOURS PRN
Qty: 18 G | Refills: 3 | Status: SHIPPED | OUTPATIENT
Start: 2024-04-29

## 2024-05-14 DIAGNOSIS — F32.A ANXIETY AND DEPRESSION: ICD-10-CM

## 2024-05-14 DIAGNOSIS — F41.9 ANXIETY AND DEPRESSION: ICD-10-CM

## 2024-05-14 RX ORDER — FLUOXETINE HYDROCHLORIDE 20 MG/1
CAPSULE ORAL
Qty: 90 CAPSULE | Refills: 1 | Status: SHIPPED | OUTPATIENT
Start: 2024-05-14

## 2024-05-23 ENCOUNTER — TELEPHONE (OUTPATIENT)
Dept: FAMILY MEDICINE | Facility: CLINIC | Age: 84
End: 2024-05-23
Payer: MEDICARE

## 2024-05-23 DIAGNOSIS — Z51.5 ENCOUNTER FOR HOSPICE CARE: Primary | ICD-10-CM

## 2024-05-23 NOTE — TELEPHONE ENCOUNTER
----- Message from Fernanda Simental MD sent at 5/22/2024  6:44 PM CDT -----  If possible please send appropriate referral  ----- Message -----  From: Cordell Plascencia MA  Sent: 5/22/2024  11:14 AM CDT  To: Fernanda Simental MD    Pt is requesting an order sent to [facility] to eval and admit.

## 2024-06-03 DIAGNOSIS — J44.9 COPD WITH HYPOXIA: ICD-10-CM

## 2024-06-03 RX ORDER — PREDNISONE 10 MG/1
TABLET ORAL
Qty: 47 TABLET | Refills: 0 | Status: SHIPPED | OUTPATIENT
Start: 2024-06-03

## 2024-06-15 PROBLEM — I10 ESSENTIAL (PRIMARY) HYPERTENSION: Status: ACTIVE | Noted: 2023-03-21

## 2024-06-15 PROBLEM — E78.2 MIXED HYPERLIPIDEMIA: Status: ACTIVE | Noted: 2023-03-21

## 2024-06-15 PROBLEM — J44.9 CHRONIC OBSTRUCTIVE PULMONARY DISEASE, UNSPECIFIED COPD TYPE: Status: ACTIVE | Noted: 2024-06-15

## 2024-06-22 PROBLEM — G83.4 CAUDA EQUINA SYNDROME: Status: ACTIVE | Noted: 2024-06-22

## 2024-07-10 ENCOUNTER — LAB REQUISITION (OUTPATIENT)
Dept: LAB | Facility: HOSPITAL | Age: 84
End: 2024-07-10
Payer: MEDICARE

## 2024-07-10 DIAGNOSIS — R33.9 RETENTION OF URINE, UNSPECIFIED: ICD-10-CM

## 2024-07-10 DIAGNOSIS — N39.0 URINARY TRACT INFECTION, SITE NOT SPECIFIED: ICD-10-CM

## 2024-07-10 DIAGNOSIS — R31.1 BENIGN ESSENTIAL MICROSCOPIC HEMATURIA: ICD-10-CM

## 2024-07-10 DIAGNOSIS — R82.991 HYPOCITRATURIA: ICD-10-CM

## 2024-07-10 PROCEDURE — 87205 SMEAR GRAM STAIN: CPT | Performed by: SPECIALIST

## 2024-07-10 PROCEDURE — 87086 URINE CULTURE/COLONY COUNT: CPT | Performed by: SPECIALIST

## 2024-07-11 LAB
GRAM STN SPEC: NORMAL
GRAM STN SPEC: NORMAL

## 2024-07-12 LAB — BACTERIA UR CULT: ABNORMAL

## 2025-01-01 DIAGNOSIS — J44.9 COPD WITH HYPOXIA: ICD-10-CM

## 2025-01-01 RX ORDER — IPRATROPIUM BROMIDE AND ALBUTEROL SULFATE 2.5; .5 MG/3ML; MG/3ML
SOLUTION RESPIRATORY (INHALATION)
Qty: 90 ML | Refills: 3 | OUTPATIENT
Start: 2025-01-01